# Patient Record
Sex: MALE | Race: BLACK OR AFRICAN AMERICAN
[De-identification: names, ages, dates, MRNs, and addresses within clinical notes are randomized per-mention and may not be internally consistent; named-entity substitution may affect disease eponyms.]

---

## 2017-05-03 ENCOUNTER — HOSPITAL ENCOUNTER (EMERGENCY)
Dept: HOSPITAL 61 - ER | Age: 67
Discharge: HOME | End: 2017-05-03
Payer: OTHER GOVERNMENT

## 2017-05-03 VITALS — HEIGHT: 73 IN | BODY MASS INDEX: 23.86 KG/M2 | WEIGHT: 180 LBS

## 2017-05-03 VITALS — DIASTOLIC BLOOD PRESSURE: 79 MMHG | SYSTOLIC BLOOD PRESSURE: 153 MMHG

## 2017-05-03 DIAGNOSIS — M25.552: ICD-10-CM

## 2017-05-03 DIAGNOSIS — Z88.8: ICD-10-CM

## 2017-05-03 DIAGNOSIS — F39: ICD-10-CM

## 2017-05-03 DIAGNOSIS — I10: ICD-10-CM

## 2017-05-03 DIAGNOSIS — F43.10: ICD-10-CM

## 2017-05-03 DIAGNOSIS — F10.10: ICD-10-CM

## 2017-05-03 DIAGNOSIS — F41.9: ICD-10-CM
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
## 2017-05-03 DIAGNOSIS — E78.00: ICD-10-CM

## 2017-05-03 DIAGNOSIS — G89.29: Primary | ICD-10-CM

## 2017-05-03 PROCEDURE — 99283 EMERGENCY DEPT VISIT LOW MDM: CPT

## 2017-05-03 NOTE — PHYS DOC
Past Medical History


Past Medical History:  Anxiety, High Cholesterol, Hypertension, Other


Additional Past Medical Histor:  PTSD, MOOD DISORDER, CHRONIC PAIN


Past Surgical History:  Other


Additional Past Surgical Histo:  cardiac cath with no stent placement


Alcohol Use:  Heavy


Drug Use:  None





Adult General


Chief Complaint


Chief Complaint:  HIP PAIN





HPI


HPI


Patient is a 66  year old male with a history of hypertension and PTSD, as well 

as chronic left hip pain presents the emergency room today with a complaint of 

ongoing left hip pain. Patient states that he was seen by his primary care 

provider at the [facility] 2 days ago received an injection in his hip. 

He states that the pain has been persistent and somewhat worse since that 

period of time. He denies any redness or swelling to his left hip. He denies 

any fevers, chills, myalgias.


Patient was here approximately one year ago with the same complaint. He states 

that he has not seen an orthopedic specialist as of yet for his ongoing left 

hip pain. Patient denies any additional concerns at this time.





Review of Systems


Review of Systems





Constitutional: Denies fever or chills []


Eyes: Denies change in visual acuity, redness, or eye pain []


HENT: Denies nasal congestion or sore throat []


Respiratory: Denies cough or shortness of breath []


Cardiovascular: No additional information not addressed in HPI []


GI: Denies abdominal pain, nausea, vomiting, bloody stools or diarrhea []


: Denies dysuria or hematuria []


Musculoskeletal: Denies back pain or joint pain []


Integument: Denies rash or skin lesions []


Neurologic: Denies headache, focal weakness or sensory changes []


Endocrine: Denies polyuria or polydipsia []





Allergies


Allergies





 Allergies








Coded Allergies Type Severity Reaction Last Updated Verified


 


  trazodone Allergy Intermediate  6/4/16 Yes











Physical Exam


Physical Exam





Constitutional: Well developed, well nourished, no acute distress, non-toxic 

appearance. Patient's physiologic vital signs are normal.


HENT: Normocephalic, atraumatic, bilateral external ears normal, oropharynx 

moist, no oral exudates, nose normal. []


Eyes: PERRLA, EOMI, conjunctiva normal, no discharge. [] 


Neck: Normal range of motion, no tenderness, supple, no stridor. [] 


Cardiovascular:Heart rate regular rhythm, no murmur []


Lungs & Thorax:  Bilateral breath sounds clear to auscultation []


Abdomen: Bowel sounds normal, soft, no tenderness, no masses, no pulsatile 

masses. [] 


Skin: Warm, dry, no erythema, no rash. [] 


Back: No tenderness, no CVA tenderness. [] 


Extremities: Left hip is normal in appearance. The overlying skin is without 

erythema or swelling. Patient's tenderness to palpation to the anterior lateral 

aspect of his left hip. There is no palpable defect or deformity. Patient's hip 

is stable. Patient complains of increased pain with circumduction. There is 

mild crepitation in the left hip upon circumduction. Flexion and extension is 

intact. Patient's left lower extremity is warm and dry. Neurovascularly intact 

with capillary refill less than 2 seconds in status.


Neurologic: Alert and oriented X 3, normal motor function, normal sensory 

function, no focal deficits noted. []


Psychologic: Affect normal, judgement normal, mood normal. []





Current Patient Data


Vital Signs





 Vital Signs








  Date Time  Temp Pulse Resp B/P Pulse Ox O2 Delivery O2 Flow Rate FiO2


 


5/3/17 07:10 98.1 80 18  98 Room Air  





 98.1       











EKG


EKG


[]





Radiology/Procedures


Radiology/Procedures


[]





Course & Med Decision Making


Course & Med Decision Making


Pertinent Labs and Imaging studies reviewed. (See chart for details)





[]





Dragon Disclaimer


Dragon Disclaimer


This electronic medical record was generated, in whole or in part, using a 

voice recognition dictation system.





Departure


Departure


Impression:  


 Primary Impression:  


 Hip pain, chronic


Disposition:  01 HOME, SELF-CARE


Condition:  GOOD


Referrals:  


NO PCP (PCP)


Patient Instructions:  Chronic Pain, Osteoarthritis





Additional Instructions:


1. Take the medication as prescribed. Do not take any additional acetaminophen 

while taking the pain medication. Take the pain medication only for moderate to 

severe pain.


2. Review the discharge instructions provided for self-care and reasons to 

return to the emergency department.


3. Follow-up with your primary care provider at the [facility] and 

discuss referral to orthopedic service for further evaluation of your hip pain.


Scripts


Hydrocodone/Apap 5-325 (Norco 5-325 Tablet)1 Each Tablet1 Tab PO PRN Q6HRS PRN 

PAIN #15 TAB


   Prov:DANIEL VALENTE         5/3/17








DANIEL VALENTE May 3, 2017 07:32

## 2017-05-06 ENCOUNTER — HOSPITAL ENCOUNTER (EMERGENCY)
Dept: HOSPITAL 61 - ER | Age: 67
Discharge: HOME | End: 2017-05-06
Payer: MEDICARE

## 2017-05-06 VITALS — WEIGHT: 180 LBS | BODY MASS INDEX: 23.86 KG/M2 | HEIGHT: 73 IN

## 2017-05-06 VITALS — SYSTOLIC BLOOD PRESSURE: 158 MMHG | DIASTOLIC BLOOD PRESSURE: 80 MMHG

## 2017-05-06 DIAGNOSIS — E78.00: ICD-10-CM

## 2017-05-06 DIAGNOSIS — G89.29: Primary | ICD-10-CM

## 2017-05-06 DIAGNOSIS — F41.9: ICD-10-CM

## 2017-05-06 DIAGNOSIS — Z88.5: ICD-10-CM

## 2017-05-06 DIAGNOSIS — F10.10: ICD-10-CM

## 2017-05-06 DIAGNOSIS — F43.10: ICD-10-CM

## 2017-05-06 DIAGNOSIS — I10: ICD-10-CM

## 2017-05-06 DIAGNOSIS — M25.552: ICD-10-CM

## 2017-05-06 PROCEDURE — 96372 THER/PROPH/DIAG INJ SC/IM: CPT

## 2017-05-06 PROCEDURE — 99284 EMERGENCY DEPT VISIT MOD MDM: CPT

## 2017-05-06 NOTE — PHYS DOC
Past Medical History


Past Medical History:  Anxiety, High Cholesterol, Hypertension, Other


Additional Past Medical Histor:  PTSD, MOOD DISORDER, CHRONIC PAIN


Past Surgical History:  Other


Additional Past Surgical Histo:  cardiac cath with no stent placement


Alcohol Use:  Heavy


Drug Use:  None





Adult General


Chief Complaint


Chief Complaint:  HIP PAIN





HPI


HPI





Patient is a 66  year old male with history of hypertension, high cholesterol, 

anxiety, chronic left hip pain, who presents today with exacerbation of chronic 

left hip pain since yesterday. Patient states yesterday he went to a baseball 

game. He states he walked for quite some time. Patient states when he got home 

he had exacerbation of his chronic left hip pain. [facility] and [facility] in his hip. He states he 

was in the ED 3 days ago for hip pain and was given hydrocodone for pain. He 

states the hydrocodone is not helping. Patient states he [facility] 

[facility] 2017. Patient denies any injury.





Review of Systems


Review of Systems





Constitutional: Denies fever or chills []


Eyes: Denies change in visual acuity, redness, or eye pain []


: Denies dysuria or hematuria []


Musculoskeletal: Left hip pain


Integument: Denies rash or skin lesions []


Neurologic: Denies headache, focal weakness or sensory changes []


Endocrine: Denies polyuria or polydipsia []





Current Medications


Current Medications





Current Medications








 Medications


  (Trade)  Dose


 Ordered  Sig/Isaiah  Start Time


 Stop Time Status Last Admin


Dose Admin


 


 Dexamethasone


 Sodium Phosphate


  (Decadron)  10 mg  1X  ONCE  5/6/17 07:15


 5/6/17 07:16 UNV  


 


 


 Ketorolac


 Tromethamine


  (Toradol Im)  60 mg  1X  ONCE  5/6/17 07:15


 5/6/17 07:16 UNV  


 


 


 Morphine Sulfate  5 mg  1X  ONCE  5/6/17 07:15


 5/6/17 07:16 UNV  


 











Allergies


Allergies





Allergies








Coded Allergies Type Severity Reaction Last Updated Verified


 


  trazodone Allergy Intermediate  6/4/16 Yes











Physical Exam


Physical Exam





Constitutional: Well developed, well nourished, no acute distress, non-toxic 

appearance. []


HENT: Normocephalic, atraumatic, bilateral external ears normal, oropharynx 

moist, no oral exudates, nose normal. []


Eyes: PERRLA, EOMI, conjunctiva normal, no discharge. [] 





Abdomen: Bowel sounds normal, soft, no tenderness, no masses, no pulsatile 

masses. [] 


Skin: Warm, dry, no erythema, no rash. [] 


Back: No tenderness, no CVA tenderness. [] 


Extremities: Left hip with no obvious deformity. Tenderness diffusely on 

palpation of the left posterior hip. Full range of motion to the left hip. 

Adequate internal rotation and external rotation of the left hip. Adequate 

flexion and extension of the left lower extremity. +2 left pedal pulse. Cap 

refill less than 2 seconds the left lower extremity. Sensation intact to the 

left lower extremity.


Neurologic: Alert and oriented X 3, normal motor function, normal sensory 

function, no focal deficits noted. []


Psychologic: Affect normal, judgement normal, mood normal. []





Current Patient Data


Vital Signs





 Vital Signs








  Date Time  Temp Pulse Resp B/P (MAP) Pulse Ox O2 Delivery O2 Flow Rate FiO2


 


5/6/17 07:05 97.2 80 18 181/87 (118) 100 Room Air  





 97.2       











EKG


EKG


[]





Radiology/Procedures


Radiology/Procedures


[]





Course & Med Decision Making


Course & Med Decision Making


Pertinent Labs and Imaging studies reviewed. (See chart for details)





Patient is in the ED with exacerbation of chronic left hip pain. He'll be given 

Toradol, morphine, and Decadron IM in the ED. He has pain medicine at home. 

Gave him a prescription of Medrol Dosepak. Follow-up with his own doctor as 

soon as he can.





Dragon Disclaimer


Dragon Disclaimer


This electronic medical record was generated, in whole or in part, using a 

voice recognition dictation system.





Departure


Departure


Impression:  


 Primary Impression:  


 Hip pain, chronic


Disposition:  01 HOME, SELF-CARE


Condition:  STABLE


Referrals:  


NO PCP (PCP)








MARISSA DOYLE MD


Follow-up in one week


Patient Instructions:  Hip Pain





Additional Instructions:  


You were seen for exacerbation of chronic hip pain. Please take the prescribed 

medicines as ordered. Continue taking your hydrocodone at home as needed as 

well as an [facility]. [facility] with [facility] 

[facility] as soon as possible or the doctor we provide. He is an orthopedic doctor.


Scripts


Methylprednisolone (MEDROL) 4 Mg Tab.ds.pk


1 PKG PO UD, #1 PKG


   Prov: PAULINE ACE APRN         5/6/17





Problem Qualifiers








 Primary Impression:  


 Hip pain, chronic


 Laterality:  left  Qualified Codes:  M25.552 - Pain in left hip; G89.29 - 

Other chronic pain








PAULINE ACE May 6, 2017 07:25

## 2017-05-07 ENCOUNTER — HOSPITAL ENCOUNTER (INPATIENT)
Dept: HOSPITAL 61 - ER | Age: 67
LOS: 2 days | Discharge: HOME | DRG: 314 | End: 2017-05-09
Attending: INTERNAL MEDICINE | Admitting: INTERNAL MEDICINE
Payer: MEDICARE

## 2017-05-07 VITALS — SYSTOLIC BLOOD PRESSURE: 141 MMHG | DIASTOLIC BLOOD PRESSURE: 67 MMHG

## 2017-05-07 VITALS — SYSTOLIC BLOOD PRESSURE: 128 MMHG | DIASTOLIC BLOOD PRESSURE: 71 MMHG

## 2017-05-07 VITALS — DIASTOLIC BLOOD PRESSURE: 62 MMHG | SYSTOLIC BLOOD PRESSURE: 130 MMHG

## 2017-05-07 VITALS — DIASTOLIC BLOOD PRESSURE: 75 MMHG | SYSTOLIC BLOOD PRESSURE: 120 MMHG

## 2017-05-07 VITALS — DIASTOLIC BLOOD PRESSURE: 75 MMHG | SYSTOLIC BLOOD PRESSURE: 156 MMHG

## 2017-05-07 VITALS — DIASTOLIC BLOOD PRESSURE: 77 MMHG | SYSTOLIC BLOOD PRESSURE: 135 MMHG

## 2017-05-07 VITALS — HEIGHT: 73 IN | BODY MASS INDEX: 24.04 KG/M2 | WEIGHT: 181.38 LBS

## 2017-05-07 DIAGNOSIS — Z82.49: ICD-10-CM

## 2017-05-07 DIAGNOSIS — E87.1: ICD-10-CM

## 2017-05-07 DIAGNOSIS — I10: ICD-10-CM

## 2017-05-07 DIAGNOSIS — G89.29: ICD-10-CM

## 2017-05-07 DIAGNOSIS — I95.9: Primary | ICD-10-CM

## 2017-05-07 DIAGNOSIS — N17.0: ICD-10-CM

## 2017-05-07 DIAGNOSIS — F41.9: ICD-10-CM

## 2017-05-07 DIAGNOSIS — M19.90: ICD-10-CM

## 2017-05-07 DIAGNOSIS — F10.20: ICD-10-CM

## 2017-05-07 DIAGNOSIS — F39: ICD-10-CM

## 2017-05-07 DIAGNOSIS — E87.6: ICD-10-CM

## 2017-05-07 DIAGNOSIS — Z88.8: ICD-10-CM

## 2017-05-07 DIAGNOSIS — E87.5: ICD-10-CM

## 2017-05-07 DIAGNOSIS — E78.00: ICD-10-CM

## 2017-05-07 DIAGNOSIS — R33.9: ICD-10-CM

## 2017-05-07 DIAGNOSIS — E22.2: ICD-10-CM

## 2017-05-07 DIAGNOSIS — F17.200: ICD-10-CM

## 2017-05-07 DIAGNOSIS — R00.1: ICD-10-CM

## 2017-05-07 DIAGNOSIS — F43.10: ICD-10-CM

## 2017-05-07 LAB
ALBUMIN SERPL-MCNC: 3.5 G/DL (ref 3.4–5)
ALP SERPL-CCNC: 59 U/L (ref 46–116)
ALT SERPL-CCNC: 29 U/L (ref 16–63)
ANION GAP SERPL CALC-SCNC: 8 MMOL/L (ref 6–14)
ANION GAP SERPL CALC-SCNC: 9 MMOL/L (ref 6–14)
AST SERPL-CCNC: 26 U/L (ref 15–37)
BASOPHILS # BLD AUTO: 0 X10^3/UL (ref 0–0.2)
BASOPHILS NFR BLD: 0 % (ref 0–3)
BILIRUB DIRECT SERPL-MCNC: 0.2 MG/DL (ref 0–0.2)
BILIRUB SERPL-MCNC: 0.6 MG/DL (ref 0.2–1)
BUN SERPL-MCNC: 29 MG/DL (ref 8–26)
BUN SERPL-MCNC: 31 MG/DL (ref 8–26)
CALCIUM SERPL-MCNC: 7.7 MG/DL (ref 8.5–10.1)
CALCIUM SERPL-MCNC: 8.3 MG/DL (ref 8.5–10.1)
CHLORIDE SERPL-SCNC: 86 MMOL/L (ref 98–107)
CHLORIDE SERPL-SCNC: 89 MMOL/L (ref 98–107)
CO2 SERPL-SCNC: 22 MMOL/L (ref 21–32)
CO2 SERPL-SCNC: 23 MMOL/L (ref 21–32)
CREAT SERPL-MCNC: 2.3 MG/DL (ref 0.7–1.3)
CREAT SERPL-MCNC: 2.4 MG/DL (ref 0.7–1.3)
EOSINOPHIL NFR BLD: 0 % (ref 0–3)
ERYTHROCYTE [DISTWIDTH] IN BLOOD BY AUTOMATED COUNT: 16.3 % (ref 11.5–14.5)
GFR SERPLBLD BASED ON 1.73 SQ M-ARVRAT: 33 ML/MIN
GFR SERPLBLD BASED ON 1.73 SQ M-ARVRAT: 34.7 ML/MIN
GLUCOSE SERPL-MCNC: 119 MG/DL (ref 70–99)
GLUCOSE SERPL-MCNC: 126 MG/DL (ref 70–99)
HCT VFR BLD CALC: 30.9 % (ref 39–53)
HGB BLD-MCNC: 10.3 G/DL (ref 13–17.5)
LYMPHOCYTES # BLD: 1.4 X10^3/UL (ref 1–4.8)
LYMPHOCYTES NFR BLD AUTO: 9 % (ref 24–48)
MAGNESIUM SERPL-MCNC: 2.1 MG/DL (ref 1.8–2.4)
MCH RBC QN AUTO: 27 PG (ref 25–35)
MCHC RBC AUTO-ENTMCNC: 33 G/DL (ref 31–37)
MCV RBC AUTO: 82 FL (ref 79–100)
MONOCYTES NFR BLD: 7 % (ref 0–9)
NEUTROPHILS NFR BLD AUTO: 84 % (ref 31–73)
PLATELET # BLD AUTO: 282 X10^3/UL (ref 140–400)
PLATELET # BLD EST: ADEQUATE 10*3/UL
POLYCHROMASIA BLD QL SMEAR: SLIGHT
POTASSIUM SERPL-SCNC: 6.1 MMOL/L (ref 3.5–5.1)
POTASSIUM SERPL-SCNC: 6.5 MMOL/L (ref 3.5–5.1)
PROT SERPL-MCNC: 7.1 G/DL (ref 6.4–8.2)
RBC # BLD AUTO: 3.78 X10^6/UL (ref 4.3–5.7)
SODIUM SERPL-SCNC: 117 MMOL/L (ref 136–145)
SODIUM SERPL-SCNC: 120 MMOL/L (ref 136–145)
WBC # BLD AUTO: 15.1 X10^3/UL (ref 4–11)

## 2017-05-07 PROCEDURE — 80048 BASIC METABOLIC PNL TOTAL CA: CPT

## 2017-05-07 PROCEDURE — 80076 HEPATIC FUNCTION PANEL: CPT

## 2017-05-07 PROCEDURE — 96361 HYDRATE IV INFUSION ADD-ON: CPT

## 2017-05-07 PROCEDURE — 87641 MR-STAPH DNA AMP PROBE: CPT

## 2017-05-07 PROCEDURE — 93005 ELECTROCARDIOGRAM TRACING: CPT

## 2017-05-07 PROCEDURE — 83880 ASSAY OF NATRIURETIC PEPTIDE: CPT

## 2017-05-07 PROCEDURE — 71250 CT THORAX DX C-: CPT

## 2017-05-07 PROCEDURE — 84443 ASSAY THYROID STIM HORMONE: CPT

## 2017-05-07 PROCEDURE — 82728 ASSAY OF FERRITIN: CPT

## 2017-05-07 PROCEDURE — 82746 ASSAY OF FOLIC ACID SERUM: CPT

## 2017-05-07 PROCEDURE — 85045 AUTOMATED RETICULOCYTE COUNT: CPT

## 2017-05-07 PROCEDURE — 83735 ASSAY OF MAGNESIUM: CPT

## 2017-05-07 PROCEDURE — 83550 IRON BINDING TEST: CPT

## 2017-05-07 PROCEDURE — 99406 BEHAV CHNG SMOKING 3-10 MIN: CPT

## 2017-05-07 PROCEDURE — 96374 THER/PROPH/DIAG INJ IV PUSH: CPT

## 2017-05-07 PROCEDURE — 83540 ASSAY OF IRON: CPT

## 2017-05-07 PROCEDURE — 76770 US EXAM ABDO BACK WALL COMP: CPT

## 2017-05-07 PROCEDURE — 36415 COLL VENOUS BLD VENIPUNCTURE: CPT

## 2017-05-07 PROCEDURE — 85027 COMPLETE CBC AUTOMATED: CPT

## 2017-05-07 PROCEDURE — 81001 URINALYSIS AUTO W/SCOPE: CPT

## 2017-05-07 PROCEDURE — 82607 VITAMIN B-12: CPT

## 2017-05-07 PROCEDURE — 80061 LIPID PANEL: CPT

## 2017-05-07 PROCEDURE — 71010: CPT

## 2017-05-07 PROCEDURE — 85007 BL SMEAR W/DIFF WBC COUNT: CPT

## 2017-05-07 PROCEDURE — 93306 TTE W/DOPPLER COMPLETE: CPT

## 2017-05-07 PROCEDURE — 84484 ASSAY OF TROPONIN QUANT: CPT

## 2017-05-07 NOTE — ACF
Admission Forms Criteria


 HYPONATREMIA; HYPERNATREMIA; HYPOKALEMIA; 


 HYPERKALEMIA; HYPOCALCEMIA; HYPERCALCEMIA





Clinical Indications for Inpatient Care





                                                            (Place 'X' for any 

and all applicable criteria):





Ongoing inpatient care may be indicated for ANY ONE of the following [G](1)(2)(3

)(5):





[X ]I.    Hyponatremia with ANY ONE of the following:


         [X ]a)  Sodium less than 130 mEq/L (mmol/L) (new) (6)(22)


         [ ]b)  Sodium less than 135 mEq/L (mmol/L) with ANY ONE of the 

following:


                [ ]i)    Severe medical etiology requiring inpatient management 

(eg, heart failure, hypovolemia)


                [ ]ii)   Altered mental status     


                [ ]iii)  Seizures


[ ]II.    Hypernatremia with ANY ONE of the following:


          [ ]a) Sodium greater than 155 mEq/L (mmol/L)


          [ ]b) Sodium greater than 150 mEq/L (mmol/L) with ANY ONE of the 

following:


                [ ] i)   Altered mental status 


                [ ]ii)   Seizures


                [ ]iii)  Severe medical etiology (eg, hypovolemia, diabetes 

insipidus)


                [ ]iv)  Severe weakness


                [ ]v)   Severe medical etiology (eg, hemolysis, infection, drug 

overdose) 


[ ]III.  Hypokalemia with ANY ONE of the following:


        [ ]a)  Potassium less than 2.5 mEq/L (mmol/L) despite outpatient and 

emergency treatment 


        [ ]b)  Potassium less than 3.0 mEq/L (mmol/L) with ANY ONE of the 

following:


                [ ]i)    Weakness


                [ ]ii)   Cardiac abnormality (eg, arrhythmia, conduction 

disturbance)


                [ ]iii)  Cardiac ischemia 


                [ ]iv)   Ileus


                [ ]v)   Ongoing medical cause requiring inpatient management. (

e.g., acute renal wasting, SIADH)


                [ ]vi)  Other severe symptoms      


[ ] IV. Hyperkalemia with ANY ONE of the following:


         [ ]a)  Potassium greater than 6.5 mEq/L (mmol/L)


         [ ]b)  Potassium greater than 5 mEq/L (mmol/L) with  ANY ONE of the 

following:


                [ ]i)    Severe ECG findings [H]


                [ ]ii)   Acute worsening of renal failure (creatinine greater 

than 2.5 mg/dL (221 micromoles/L) 


                        or significant elevation for age and size)


[ ] V.  Hypocalcemia with ANY ONE of the following:


         [ ]a)  Calcium less than 7 mg/dL (1.75 mmol/L) despite outpatient and 

emergency treatment(19)


         [ ]b)  Calcium less than 8 mg/dL (2 mmol/L) with significant symptoms 

or findings; examples include:


                [ ]i)     Cardiac abnormality (eg, arrhythmia or conduction 

disturbance)


                [ ]ii)    Altered mental status 


                [ ]iii)   Seizures


                [ ]iv)   Breathing difficulty 


                [ ]v)    Muscle spasms


[ ]VI. Hypercalcemia with ANY ONE of the following:      


        [ ]a)  Calcium greater than 14 mg/dL (3.5 mmol/L)


        [ ]b)  Calcium greater than 12 mg/dL (3 mmol/L) with ANY ONE of the 

following:


                [ ]i) Significant dehydration or hypovolemia as indicated by 

ANY ONE of the following(2):


                      [ ]1. Clinically significant dehydration as indicated by 

ANY ONE of the following:   


                             [ ]A. Acute loss of weight from baseline (5% of 

body weight in adults, 9% in pediatric patients)


                             [ ]B. Hemodynamic instability


                             [ ]C. Acute renal failure


                             [ ]D. Serum sodium greater than 150 mEq/L (mmol/L)

               


                      [ ]2) Dehydration that is persistent indicated by ALL of 

the following:


                             [ ]A. Oral rehydration therapy not tolerated or 

insufficient to adequately correct dehydration


                             [ ]B. Appropriate intravenous treatment (eg, fluids

) does not readily correct dehydration ie, after 12 to 24 hours of treatment)


                [ ]ii) Significant symptoms or findings; examples include: 


                       [ ]1) Altered mental status


                       [ ]2) Cardiac abnormality (eg, arrhythmia, conduction 

disturbance)     


                       [ ]3) Cardiac abnormality (eg, arrhythmia, conduction 

disturbance)








The original [facility] content created by [facility] has been revised. 


The portions of  the content which have been revised are identified through the 

use of italic text or in bold, and [facility] [employer] 


has neither reviewed nor approved the modified material. All other unmodified 

content is copyright  [facility] [employer]    





Please see references footnoted in the original [facility] [employer] edition 

2016


Admission Criteria Met?:  Yes











RAMSEY JACOME May 7, 2017 21:32

## 2017-05-07 NOTE — PHYS DOC
Past Medical History


Past Medical History:  Anxiety, High Cholesterol, Hypertension, Other


Additional Past Medical Histor:  PTSD, MOOD DISORDER, CHRONIC PAIN


Past Surgical History:  Other


Additional Past Surgical Histo:  cardiac cath with no stent placement


Alcohol Use:  Heavy


Drug Use:  None





Adult General


Chief Complaint


Chief Complaint:  DIZZY/LIGHT HEADED





HPI


HPI





Patient is a 66  year old male who presents to the ED with the complaint of 

dizzy, weak, and low blood pressure. Patient states that about 1 or 1-1/2 weeks 

ago, his doctor put him on amlodipine for blood pressure. He has been taking 

lisinopril for hypertension for a long time and has not had any problems with 

it. Over the last few weeks, he is been suffering from hip pain, it's unclear 

to me whether he has sciatica or some type of DJD problem in his hip. When he 

saw his doctor about 1-1/2 weeks ago for a visit, he is his hip was bothering 

him a lot. His blood pressure was elevated and his doctor added amlodipine to 

his regimen. He began taking this as prescribed and took a dose of his 

antihypertensives this morning sometime before noon. He was seen here yesterday 

with hip pain and was given a prescription for Medrol Dosepak and Norco which 

he has been taking, states that his hip pain is much better today. Ever since 

he got up this morning, he is been dizzy. His wife checked his blood pressure a 

few times and he brings in a record of several blood pressures with systolics 

in the  range. He said that's low for him.





He denies chest pain, shortness of air, denies vomiting or diarrhea, denies any 

blood in his stool or black stools. He has not had any other new medications 

other than those mentioned above. He's never had an episode like this before. 

He denies history of slow heart rate or atrial fibrillation.





Review of Systems


Review of Systems





Constitutional: Denies fever or chills []


Eyes: Denies change in visual acuity, redness, or eye pain []


HENT: Denies nasal congestion or sore throat []


Respiratory: Denies cough or shortness of breath []


Cardiovascular: Denies chest pain


GI: Denies abdominal pain, nausea, vomiting, bloody stools or diarrhea []


: Denies dysuria or hematuria []


Musculoskeletal: Denies back pain or joint pain []


Integument: Denies rash or skin lesions []


Neurologic: Denies headache, focal weakness or sensory changes []





Current Medications


Current Medications





Current Medications








 Medications


  (Trade)  Dose


 Ordered  Sig/Isaiah  Start Time


 Stop Time Status Last Admin


Dose Admin


 


 Sodium


 Polystyrene


 Sulfonate


  (Kayexalate)  15 gm  1X  ONCE  5/7/17 18:15


 5/7/17 18:16 DC 5/7/17 18:27


15 GM


 


 Sodium Chloride  1,000 ml @ 


 1,000 mls/hr  Q1H  5/7/17 17:15


 5/7/17 18:14 DC 5/7/17 17:14


1,000 MLS/HR











Allergies


Allergies





Allergies








Coded Allergies Type Severity Reaction Last Updated Verified


 


  trazodone Allergy Intermediate  6/4/16 Yes











Physical Exam


Physical Exam





Constitutional: Well developed, well nourished, no acute distress, non-toxic 

appearance. Alert, mentating normally, does not appear clinically hypotensive.


HENT: Normocephalic, atraumatic, bilateral external ears normal,  nose normal. [

]


Eyes:  conjunctiva normal, no discharge. [] 


Neck: Normal range of motion,  no stridor. [] 


Cardiovascular:Heart rate regular bradycardia approximately 40, no murmur


Lungs & Thorax:  Bilateral breath sounds clear to auscultation []


Abdomen: Bowel sounds normal, soft, no tenderness, no masses, no pulsatile 

masses. [] 


Skin: Warm, dry, no erythema, no rash. [] 


Extremities: No tenderness, no cyanosis, no clubbing, ROM intact, no edema. [] 


Neurologic: Alert and oriented X 3, normal motor function, normal sensory 

function, no focal deficits noted. []





Current Patient Data


Vital Signs





 Vital Signs








  Date Time  Temp Pulse Resp B/P (MAP) Pulse Ox O2 Delivery O2 Flow Rate FiO2


 


5/7/17 20:00  42  108/62 (77) 97 Room Air  


 


5/7/17 16:17 98.0  18     





 98.0       








Lab Values





 Laboratory Tests








Test


  5/7/17


16:35


 


White Blood Count


  15.1 x10^3/uL


(4.0-11.0)  H


 


Red Blood Count


  3.78 x10^6/uL


(4.30-5.70)  L


 


Hemoglobin


  10.3 g/dL


(13.0-17.5)  L


 


Hematocrit


  30.9 %


(39.0-53.0)  L


 


Mean Corpuscular Volume


  82 fL ()


 


 


Mean Corpuscular Hemoglobin 27 pg (25-35)  


 


Mean Corpuscular Hemoglobin


Concent 33 g/dL


(31-37)


 


Red Cell Distribution Width


  16.3 %


(11.5-14.5)  H


 


Platelet Count


  282 x10^3/uL


(140-400)


 


Neutrophils (%) (Auto) 84 % (31-73)  H


 


Lymphocytes (%) (Auto) 9 % (24-48)  L


 


Monocytes (%) (Auto) 7 % (0-9)  


 


Eosinophils (%) (Auto) 0 % (0-3)  


 


Basophils (%) (Auto) 0 % (0-3)  


 


Neutrophils # (Auto)


  12.7 x10^3uL


(1.8-7.7)  H


 


Lymphocytes # (Auto)


  1.4 x10^3/uL


(1.0-4.8)


 


Monocytes # (Auto)


  1.0 x10^3/uL


(0.0-1.1)


 


Eosinophils # (Auto)


  0.0 x10^3/uL


(0.0-0.7)


 


Basophils # (Auto)


  0.0 x10^3/uL


(0.0-0.2)


 


Segmented Neutrophils % 73 % (35-66)  H


 


Band Neutrophils % 3 % (0-9)  


 


Lymphocytes % 17 % (24-48)  L


 


Monocytes % 7 % (0-10)  


 


Platelet Estimate


  Adequate


(ADEQUATE)


 


Polychromasia Slight  


 


Sodium Level


  117 mmol/L


(136-145)  *L


 


Potassium Level


  6.5 mmol/L


(3.5-5.1)  *H


 


Chloride Level


  86 mmol/L


()  L


 


Carbon Dioxide Level


  23 mmol/L


(21-32)


 


Anion Gap 8 (6-14)  


 


Blood Urea Nitrogen


  29 mg/dL


(8-26)  H


 


Creatinine


  2.4 mg/dL


(0.7-1.3)  H


 


Estimated GFR


(Cockcroft-Gault) 33.0  


 


 


Glucose Level


  126 mg/dL


(70-99)  H


 


Calcium Level


  8.3 mg/dL


(8.5-10.1)  L


 


Magnesium Level


  2.1 mg/dL


(1.8-2.4)


 


Total Bilirubin


  0.6 mg/dL


(0.2-1.0)


 


Direct Bilirubin


  0.2 mg/dL


(0.0-0.2)


 


Aspartate Amino Transferase


(AST) 26 U/L (15-37)


 


 


Alanine Aminotransferase (ALT)


  29 U/L (16-63)


 


 


Alkaline Phosphatase


  59 U/L


()


 


Troponin I Quantitative


  < 0.017 ng/mL


(0.000-0.055)


 


NT-Pro-B-Type Natriuretic


Peptide 2216 pg/mL


(0-124)  H


 


Total Protein


  7.1 g/dL


(6.4-8.2)


 


Albumin


  3.5 g/dL


(3.4-5.0)





 Laboratory Tests


5/7/17 16:35








 Laboratory Tests


5/7/17 16:35














EKG


EKG


Twelve-lead EKG read by me. There are no P waves. The rhythm is regular. It is 

likely junctional. It is narrow complex. There are no acute ST or T wave 

changes indicative of ischemia or infarction. No STEMI. []





Radiology/Procedures


Radiology/Procedures


[]





Course & Med Decision Making


Course & Med Decision Making


Pertinent Labs and Imaging studies reviewed. (See chart for details)





66-year-old male presents with weakness, hypotension, bradycardia. He did 

recently start amlodipine about 1 or 1-1/2 weeks ago.





Labs remarkable for significant hyponatremia, hyperkalemia, elevated 

creatinine. I do not know his baseline, [facility].





His potassium is 6.5 which I'm not sure would cause a junctional rhythm but I 

did treat him with IV fluids, normal saline, and oral Kayexalate. Patient 

rested comfortably in the emergency department. He remained bradycardic 

approximately 38 but he was alert, mentating normally, without complaints while 

laying in the ED on the cart. I discussed admission to the hospital with him 

and he is agreeable to that.





I discussed the case with Dr. Paez, hospitals medicine, who will admit the 

patient. I wrote bridge orders. She requested a consult with Dr. Cerda, 

cardiology. Also a consult with nephrology.





I discussed the case with Dr. Cerda, who suggested that we give him some 

more fluids and also IV Lasix for the hyperkalemia, which was done. The patient 

remained stable and went to the ICU in stable and unchanged condition.





[]





Dragon Disclaimer


Dragon Disclaimer


This electronic medical record was generated, in whole or in part, using a 

voice recognition dictation system.





Departure


Departure


Impression:  


 Primary Impression:  


 Bradycardia


 Additional Impression:  


 Hyperkalemia


Disposition:  09 ADMITTED AS INPATIENT


Admitting Physician:  Tami Paez


Condition:  STABLE


Referrals:  


NO PCP (PCP)





Problem Qualifiers











FIOR CADENA MD May 7, 2017 20:23

## 2017-05-08 VITALS — DIASTOLIC BLOOD PRESSURE: 58 MMHG | SYSTOLIC BLOOD PRESSURE: 117 MMHG

## 2017-05-08 VITALS — SYSTOLIC BLOOD PRESSURE: 135 MMHG | DIASTOLIC BLOOD PRESSURE: 67 MMHG

## 2017-05-08 VITALS — DIASTOLIC BLOOD PRESSURE: 78 MMHG | SYSTOLIC BLOOD PRESSURE: 127 MMHG

## 2017-05-08 VITALS — DIASTOLIC BLOOD PRESSURE: 69 MMHG | SYSTOLIC BLOOD PRESSURE: 147 MMHG

## 2017-05-08 VITALS — DIASTOLIC BLOOD PRESSURE: 66 MMHG | SYSTOLIC BLOOD PRESSURE: 131 MMHG

## 2017-05-08 VITALS — SYSTOLIC BLOOD PRESSURE: 110 MMHG | DIASTOLIC BLOOD PRESSURE: 58 MMHG

## 2017-05-08 VITALS — SYSTOLIC BLOOD PRESSURE: 109 MMHG | DIASTOLIC BLOOD PRESSURE: 61 MMHG

## 2017-05-08 VITALS — DIASTOLIC BLOOD PRESSURE: 57 MMHG | SYSTOLIC BLOOD PRESSURE: 110 MMHG

## 2017-05-08 VITALS — SYSTOLIC BLOOD PRESSURE: 130 MMHG | DIASTOLIC BLOOD PRESSURE: 76 MMHG

## 2017-05-08 VITALS — DIASTOLIC BLOOD PRESSURE: 75 MMHG | SYSTOLIC BLOOD PRESSURE: 113 MMHG

## 2017-05-08 VITALS — DIASTOLIC BLOOD PRESSURE: 71 MMHG | SYSTOLIC BLOOD PRESSURE: 141 MMHG

## 2017-05-08 VITALS — DIASTOLIC BLOOD PRESSURE: 71 MMHG | SYSTOLIC BLOOD PRESSURE: 134 MMHG

## 2017-05-08 VITALS — SYSTOLIC BLOOD PRESSURE: 122 MMHG | DIASTOLIC BLOOD PRESSURE: 67 MMHG

## 2017-05-08 VITALS — SYSTOLIC BLOOD PRESSURE: 150 MMHG | DIASTOLIC BLOOD PRESSURE: 75 MMHG

## 2017-05-08 LAB
ANION GAP SERPL CALC-SCNC: 10 MMOL/L (ref 6–14)
ANION GAP SERPL CALC-SCNC: 8 MMOL/L (ref 6–14)
BACTERIA #/AREA URNS HPF: 0 /HPF
BILIRUB UR QL STRIP: NEGATIVE
BUN SERPL-MCNC: 26 MG/DL (ref 8–26)
BUN SERPL-MCNC: 27 MG/DL (ref 8–26)
CALCIUM SERPL-MCNC: 8.7 MG/DL (ref 8.5–10.1)
CALCIUM SERPL-MCNC: 9.2 MG/DL (ref 8.5–10.1)
CHLORIDE SERPL-SCNC: 93 MMOL/L (ref 98–107)
CHLORIDE SERPL-SCNC: 95 MMOL/L (ref 98–107)
CO2 SERPL-SCNC: 24 MMOL/L (ref 21–32)
CO2 SERPL-SCNC: 24 MMOL/L (ref 21–32)
CREAT SERPL-MCNC: 1.6 MG/DL (ref 0.7–1.3)
CREAT SERPL-MCNC: 1.9 MG/DL (ref 0.7–1.3)
GFR SERPLBLD BASED ON 1.73 SQ M-ARVRAT: 43.3 ML/MIN
GFR SERPLBLD BASED ON 1.73 SQ M-ARVRAT: 52.8 ML/MIN
GLUCOSE SERPL-MCNC: 101 MG/DL (ref 70–99)
GLUCOSE SERPL-MCNC: 121 MG/DL (ref 70–99)
GLUCOSE UR STRIP-MCNC: NEGATIVE MG/DL
NITRITE UR QL STRIP: NEGATIVE
PH UR STRIP: 5 [PH]
POTASSIUM SERPL-SCNC: 4.8 MMOL/L (ref 3.5–5.1)
POTASSIUM SERPL-SCNC: 5.3 MMOL/L (ref 3.5–5.1)
PROT UR STRIP-MCNC: 30 MG/DL
RBC #/AREA URNS HPF: 0 /HPF (ref 0–2)
SODIUM SERPL-SCNC: 125 MMOL/L (ref 136–145)
SODIUM SERPL-SCNC: 129 MMOL/L (ref 136–145)
SP GR UR STRIP: 1.02
SQUAMOUS #/AREA URNS LPF: (no result) /LPF
UROBILINOGEN UR-MCNC: 0.2 MG/DL
WBC #/AREA URNS HPF: (no result) /HPF (ref 0–4)

## 2017-05-08 RX ADMIN — METHYLPREDNISOLONE SCH MG: 4 TABLET ORAL at 17:53

## 2017-05-08 RX ADMIN — I-VITE, TAB 1000-60-2MG (60/BT) SCH TAB: TAB at 10:49

## 2017-05-08 RX ADMIN — MELOXICAM SCH MG: 7.5 TABLET ORAL at 10:49

## 2017-05-08 RX ADMIN — BACITRACIN SCH MLS/HR: 5000 INJECTION, POWDER, FOR SOLUTION INTRAMUSCULAR at 22:22

## 2017-05-08 RX ADMIN — CITALOPRAM HYDROBROMIDE SCH MG: 20 TABLET ORAL at 10:50

## 2017-05-08 RX ADMIN — HYDROCODONE BITARTRATE AND ACETAMINOPHEN PRN TAB: 5; 325 TABLET ORAL at 17:53

## 2017-05-08 RX ADMIN — HYDROCODONE BITARTRATE AND ACETAMINOPHEN PRN TAB: 5; 325 TABLET ORAL at 10:50

## 2017-05-08 RX ADMIN — PANTOPRAZOLE SODIUM SCH MG: 40 TABLET, DELAYED RELEASE ORAL at 10:49

## 2017-05-08 RX ADMIN — METHYLPREDNISOLONE SCH MG: 4 TABLET ORAL at 10:49

## 2017-05-08 RX ADMIN — LISINOPRIL SCH MG: 20 TABLET ORAL at 10:50

## 2017-05-08 RX ADMIN — BACITRACIN SCH MLS/HR: 5000 INJECTION, POWDER, FOR SOLUTION INTRAMUSCULAR at 10:48

## 2017-05-08 RX ADMIN — METHYLPREDNISOLONE SCH MG: 4 TABLET ORAL at 13:02

## 2017-05-08 RX ADMIN — MELOXICAM SCH MG: 7.5 TABLET ORAL at 20:36

## 2017-05-08 NOTE — RAD
Exam performed: Renal sonogram. 



Indication: Acute renal insufficiency



Date of Service: 05/08/17 . Comparison: None available 



Technique: Real-time grayscale imaging of the kidneys is performed and images

are obtained. 



Findings:



Both kidneys are normal in size and echogenicity.  The right kidney measures

11.0 x 4.3 x 4.4 cm whereas the left kidney measures 11.0 x 3.8 x 6.1 cm. 

There is no hydronephrosis or perinephric fluid collection.  



The urinary bladder is well distended and appears normal. 



Impression:



    1. Essentially unremarkable renal sonogram.

## 2017-05-08 NOTE — CARD
--------------- APPROVED REPORT --------------





EXAM: Two-dimensional and M-mode echocardiogram with Doppler and color Doppler.



Other Information 

Quality : Good



INDICATION

Abnormal ECG 

Hypotension, Bradycardia



2D DIMENSIONS 

RVDd3.2 (2.9-3.5cm)Left Atrium(2D)3.7 (1.6-4.0cm)

IVSd1.1 (0.7-1.1cm)Aortic Root(2D)2.8 (2.0-3.7cm)

LVDd4.7 (3.9-5.9cm)LVOT Diameter2.2 (1.8-2.4cm)

PWd1.1 (0.7-1.1cm)LVDs2.5 (2.5-4.0cm)

FS (%) 30.0 %SV80.5 ml

LVEF(%)60.0 (>50%)



Aortic Valve

AoV Peak Bhavesh.158.8cm/sAoV VTI29.5cm

AO Peak GR.10.1mmHgLVOT  VTI 26.49cm

AO Mean GR.5mmHgAVA   (VTI)3.50cm2



Mitral Valve

MV E Xqtfufnt50.5cm/sMV DECEL [id number]

MV A Phppdilb20.8cm/sE/A  Ratio0.9



TDI

Lateral E' P. V12.55cm/sMedial E' P. V8.32cm/s

E/Lateral E'6.0E/Medial E'9.1



Tricuspid Valve

TR P. Hympnjdk080cc/sRAP BLNWCOBV2oaAd

TR Peak Gr.[id number]



Pulmonary Vein

S1 Vpudibcj81.6cm/sS2 Ygojnhoh27.50cm/s

D2 Ckmaurxg80.5cm/sPVa ydbjmkgt04egst



 LEFT VENTRICLE 

The left ventricle is normal size. There is normal left ventricular wall thickness. The left ventricu
lar systolic function is normal and the ejection fraction is within normal range. The Ejection Fracti
on is 60%. There is normal LV segmental wall motion. Transmitral Doppler flow pattern is Grade I-abno
rmal relaxation pattern.



 RIGHT VENTRICLE 

The right ventricle is normal size. The right ventricular systolic function is normal.



 ATRIA 

The left atrium size is normal. The right atrium size is normal. The interatrial septum is intact wit
h no evidence for an atrial septal defect or patent foramen ovale as noted on 2-D or Doppler imaging.




 AORTIC VALVE 

The aortic valve is mildly thickened but opens well. Doppler and Color Flow revealed no significant a
ortic regurgitation. There is no significant aortic valvular stenosis.



 MITRAL VALVE 

The mitral valve is normal in structure There is no evidence of mitral valve prolapse. There is no mi
tral valve stenosis. Doppler and Color-flow revealed trace mitral regurgitation.



 TRICUSPID VALVE 

The tricuspid valve is normal in structure Doppler and Color Flow revealed trace tricuspid regurgitat
ion. There is mild pulmonary hypertension. The PA pressure was estimated at 31 mmHg. There is no tric
uspid valve stenosis.



 PULMONIC VALVE 

The pulmonary valve is normal in structure Doppler and Color Flow revealed trace pulmonic valvular re
gurgitation. There is no pulmonic valvular stenosis.



 GREAT VESSELS 

The aortic root is normal in size. The ascending aorta is normal in size. The IVC is normal in size a
nd collapses >50% with inspiration.



 PERICARDIAL EFFUSION 

There is no evidence of significant pericardial effusion.



Critical Notification

Critical Value: No



<Conclusion>

The left ventricular systolic function is normal and the ejection fraction is within normal range.

The Ejection Fraction is 60%.

Transmitral Doppler flow pattern is Grade I-abnormal relaxation pattern.

The left atrium size is normal.

The right atrium size is normal.

The aortic valve is mildly thickened but opens well.

Doppler and Color-flow revealed trace mitral regurgitation.

Doppler and Color Flow revealed trace tricuspid regurgitation.

There is mild pulmonary hypertension.

The PA pressure was estimated at 31 mmHg.

Doppler and Color Flow revealed trace pulmonic valvular regurgitation.

There is no evidence of significant pericardial effusion.

## 2017-05-08 NOTE — RAD
Indication: Indication: Abnormal metabolic profile.



Axial imaging through the chest was performed without intravenous contrast.



No prior studies are available for comparison.



No axillary lymphadenopathy is seen. There are calcified lymph nodes in the

mediastinum and ponce likely from prior granulomatous exposure. No pericardial

fluid is identified. There is trace left and small right pleural effusion.

Innumerable calcified nodules are identified throughout both lungs consistent

with granulomas. Multiple patchy groundglass opacities are noted within

bilateral upper lobes, greatest on the left. The lower lobes are unremarkable

apart from some mild infiltrate or atelectasis posteriorly in the right lower

lobe. The upper abdomen is unremarkable.



Impression: Patchy groundglass infiltrates are identified bilateral upper

lobes. This likely is on an infectious or inflammatory basis. There are also

trace left and small right pleural effusions.





















PQRS Compliance Statement:



One or more of the following individualized dose reduction techniques were

utilized for this examination:  

1. Automated exposure control  

2. Adjustment of the mA and/or kV according to patient size  

3. Use of iterative reconstruction technique

## 2017-05-08 NOTE — HP
ADMIT DATE:  5/8/17



CHIEF COMPLAINT:  Dizziness.



HISTORY OF PRESENT ILLNESS:  The patient is a 66-year-old -American

gentleman with past medical history of hypertension and osteoarthritis, who

presented to the Emergency Room with sudden onset of dizziness, lightheadedness,

and unsteady gait.  He relates that [facility],

but also at the Emergency Room here in the past week with severe left-sided hip

pain.  With his visit in the ER yesterday, he received Medrol Dosepak and Norco,

which seemed to address the pain fairly decently.  His electrolytes have been

checked during his visit.  He relates that about 10 days ago, he also started on

a new blood pressure medication, Norvasc.  He denies any lightheadedness,

dizziness before yesterday, but is convinced that Norvasc is at the root of all

his issues.



In the Emergency Room, he was found with a sodium of 117, potassium at 6.5,

creatinine at 2.4 and he was promptly admitted.  He received IV fluids,

Kayexalate as well as Lasix to address his electrolyte imbalances.  Potassium

improved to 6.1.  In addition, he was found bradycardic in a junctional rhythm,

which was attributed to the hyperkalemia.  He was admitted to the ICU for

further monitoring.



PAST MEDICAL HISTORY:  Hypertension and osteoarthritis.



FAMILY HISTORY:  Hypertension, osteoarthritis.  Denies any heart disease, denies

any kidney disease known in the family.



SOCIAL HISTORY:  Lives with his wife, smokes about a pack a day since age 24. 

Admits to 1 bottle of wine a day.  Denies any other drugs.



ALLERGIES:  TRAZODONE, UNKNOWN REACTION and he denies any allergies.



HOME MEDICATIONS:  Reconciled with MAR.



REVIEW OF SYSTEMS:  Lightheadedness/dizziness, now has resolved.  He feels

perfectly fine and is eager to leave the hospital.



PHYSICAL EXAMINATION:

VITAL SIGNS:  From today show a blood pressure of 141/71, heart rate of 74,

respiratory rate at 23.  He is afebrile.

GENERAL:  This is a well-nourished, well-developed, 66-year-old,

-American gentleman, alert and oriented, in no acute distress.

HEENT:  Shows no scleral icterus.

NECK:  Supple, without any lymphadenopathy.

LUNGS:  Clear to auscultation bilaterally.

HEART:  Regular rate and rhythm.

ABDOMEN:  Has positive bowel sounds, soft, nontender.

EXTREMITIES:  Show no edema.

SKIN:  Warm, soft and dry without any rash.



LABORATORY DATA:  CBC with a WBC of 15.1, hemoglobin 10.3, MCV at 82, platelets

of 282.  Chemistries with a BUN and creatinine currently at 27 and 1.9, at

admission 29 and 2.4, sodium of 125 (at admission, 117), potassium 5.3 (6.5),

calcium at 8.7.  LFTs at admission within normal limits.



IMAGING:  Chest x-ray shows no acute cardiopulmonary process.



ASSESSMENT AND PLAN:  The patient is a 66-year-old, -American, gentleman

presenting with hyponatremia, severe as well as hyperkalemia, severe and

resulting bradycardia with junctional rhythm.  Etiology of abnormality is

unclear at this point.  With what appears to be acute-on-chronic renal

insufficiency, we will obtain a consult with a Nephrology (Dr. Lee). 

Cardiology, Dr. Cerda, has been consulted as well.  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

improving.  We will continue normal saline drip for the time being.  We will

obtain additional labs including TSH, lipid profile for cardiology evaluation as

well.  We will continue his home medications, Norvasc to be added in his

hypertension resumes.



We will obtain a CT of the chest and echocardiogram to help evaluate cardiac and

pulmonary function and rule out potential malignancy as etiology of SIADH. 

Prophylaxis will be instituted with H2 blocker and heparin.



For his substance abuse including tobacco and alcohol, he prefers nicotine gum

over patch.  We will also start him on Ativan prevention protocol.

 



______________________________

FLORENTINO WALLER MD



DR:  PROSPER/nts  JOB#:  [id number] / [id number]

DD:  05/08/2017 10:32  DT:  05/08/2017 11:49

NANDO

## 2017-05-08 NOTE — PDOC2
CONSULT


Date of Consult


Date of Consult


DATE: 5/8/17 


TIME: 15:56





Reason for Consult


Reason for Consult:


MARY





Referring Physician


Referring Physician:


SAIDA





Identification/Chief Complaint


Chief Complaint


THIS IS A 66 YR OLD ADMITTED WITH FEELING POORLY. HE STATES THAT HE FELT DIZZY. 

ON ADMIT HE IS NOTED TO HAVE HYPONATREMIA, HYPERKALEMIA AND MARY WITH A CR OF 

2.4. NO CKD HX IS REPORTED. HE IS ALSO NOTED TO HAVE BRADYCARDIA. HIS K WAS 6.5 

AND HIS NA 





Source


Source:  Chart review, Patient





History of Present Illness


Reason for Visit:


AS ABOVE





Past Medical History


Cardiovascular:  HTN


Musculoskeletal:  Osteoarthritis





Family History


Family History:  Hypertension





Social History


1 pack per day


ALCOHOL:  heavy


Drugs:  None


Lives:  with Family





Current Problem List


Problem List


Problems


Medical Problems:


(1) Bradycardia


Status: Acute  





(2) Hyperkalemia


Status: Acute  











Current Medications


Current Medications





Current Medications


Sodium Chloride 1,000 ml @  1,000 mls/hr Q1H IV  Last administered on 5/7/17at 

17:14;  Start 5/7/17 at 17:15;  Stop 5/7/17 at 18:14;  Status DC


Sodium Polystyrene Sulfonate (Kayexalate) 15 gm 1X  ONCE PO  Last administered 

on 5/7/17at 18:27;  Start 5/7/17 at 18:15;  Stop 5/7/17 at 18:16;  Status DC


Sodium Chloride 1,000 ml @  1,000 mls/hr 1X  ONCE IV  Last administered on 5/7/ 17at 20:26;  Start 5/7/17 at 20:30;  Stop 5/7/17 at 21:29;  Status DC


Furosemide (Lasix) 40 mg 1X  ONCE IVP  Last administered on 5/7/17at 20:26;  

Start 5/7/17 at 20:30;  Stop 5/7/17 at 20:31;  Status DC


Sodium Chloride (Normal Saline Flush) 3 ml QSHIFT  PRN IV AFTER MEDS AND BLOOD 

DRAWS;  Start 5/8/17 at 09:30


Citalopram Hydrobromide (CeleXA) 20 mg DAILY PO  Last administered on 5/8/17at 

10:50;  Start 5/8/17 at 11:00


Acetaminophen/ Hydrocodone Bitart (Lortab 5/325) 1 tab PRN Q6HRS  PRN PO PAIN 

Last administered on 5/8/17at 10:50;  Start 5/8/17 at 10:15


Lisinopril (Prinivil) 20 mg DAILY PO  Last administered on 5/8/17at 10:50;  

Start 5/8/17 at 11:00


Meloxicam (Mobic) 7.5 mg BID PO  Last administered on 5/8/17at 10:49;  Start 5/8 /17 at 11:00


Quetiapine Fumarate (SEROquel) 25 mg HS PO ;  Start 5/8/17 at 21:00


Non-Formulary Medication 1 pkg UD PO ;  Start 5/8/17 at 10:15;  Status UNV


Pantoprazole Sodium (Protonix) 40 mg DAILYAC PO  Last administered on 5/8/17at 

10:49;  Start 5/8/17 at 11:00


Multivitamins/ Minerals (I-Harjit) 1 tab DAILY PO  Last administered on 5/8/17at 

10:49;  Start 5/8/17 at 11:00


Nicotine Polacrilex (Nicorette Gum) 1 each PRN Q2HR  PRN BC SMOKING CESSATION;  

Start 5/8/17 at 10:15


Morphine Sulfate 1 mg 1X  ONCE IM ;  Start 5/8/17 at 10:45;  Stop 5/8/17 at 10:

45;  Status DC


Sodium Chloride 1,000 ml @  100 mls/hr Q10H IV  Last administered on 5/8/17at 10

:48;  Start 5/8/17 at 10:15


Methylprednisolone (Medrol) 4 mg TIDPC PO  Last administered on 5/8/17at 13:02;

  Start 5/8/17 at 11:00;  Stop 5/8/17 at 17:31


Methylprednisolone (Medrol) 8 mg QHS PO ;  Start 5/8/17 at 21:00;  Stop 5/8/17 

at 21:01


Methylprednisolone (Medrol) 4 mg QIDAFTMEAL PO ;  Start 5/9/17 at 09:00;  Stop 5 /9/17 at 21:01


Methylprednisolone (Medrol) 4 mg TID PO ;  Start 5/10/17 at 09:00;  Stop 5/10/

17 at 21:01


Methylprednisolone (Medrol) 4 mg BID PO ;  Start 5/11/17 at 09:00;  Stop 5/11/ 17 at 21:01


Methylprednisolone (Medrol) 4 mg DAILY PO ;  Start 5/12/17 at 09:00;  Stop 5/12/ 17 at 09:01


Morphine Sulfate 1 mg 1X  ONCE IV  Last administered on 5/8/17at 10:48;  Start 5 /8/17 at 10:45;  Stop 5/8/17 at 10:46;  Status DC





Active Scripts


Active


Medrol (Methylprednisolone) 4 Mg Tab.ds.pk 1 Pkg PO UD


Norco 5-325 Tablet (Acetaminophen/Hydrocodone Bitart) 1 Each Tablet 1 Tab PO 

PRN Q6HRS PRN


Reported


Celexa (Citalopram Hydrobromide) 20 Mg Tablet 1 Tab PO DAILY


Seroquel (Quetiapine Fumarate) 25 Mg Tablet 20 Mg PO HS


Omeprazole 20 Mg Tablet.dr 1 Tab PO DAILY


Amlodipine Besylate 10 Mg Tablet 10 Mg PO DAILY


Lisinopril 20 Mg Tablet 1 Tab PO DAILY


Meloxicam 7.5 Mg Tablet 7.5 Mg PO BID


Multivitamins (Multivitamin) 1 Each Capsule 1 Each PO





Allergies


Allergies:  


Coded Allergies:  


     trazodone (Verified  Allergy, Intermediate, 6/4/16)





ROS


General:  YES: Appetite


PSYCHOLOGICAL ROS:  YES: Anxiety


Eyes:  Yes Decreased vision


HEENT:  YES: Heacaches


Respiratory:  YES: Cough


Gastrointestinal:  Yes Constipation


Genitourinary:  YES Retention, YES Other (NOCTURIA)


Musculoskeletal:  Yes Muscular Weakness





Physical Exam


General:  Alert, Oriented X3, Cooperative, No acute distress


HEENT:  Atraumatic, PERRLA


Lungs:  Clear to auscultation, Normal air movement


Heart:  Regular rate


Abdomen:  Soft, No tenderness


Neuro:  Normal speech, Cranial nerves 3-12 NL


Psych/Mental Status:  Mental status NL, Mood NL


MUSCULOSKELETAL:  No deformity





Vitals


VITALS





Vital Signs








  Date Time  Temp Pulse Resp B/P (MAP) Pulse Ox O2 Delivery O2 Flow Rate FiO2


 


5/8/17 12:30     95 Room Air  


 


5/8/17 12:00 98.9 67 20 127/78 (94)    





 98.9       


 


5/8/17 06:00       2.0 











Labs


Labs





Laboratory Tests








Test


  5/7/17


16:16 5/7/17


16:35 5/7/17


20:40 5/7/17


22:13


 


Urine Collection Type Unknown    


 


Urine Color Yellow    


 


Urine Clarity Cloudy    


 


Urine pH 5.0    


 


Urine Specific Gravity 1.020    


 


Urine Protein


  30 mg/dL


(NEG-TRACE) 


  


  


 


 


Urine Glucose (UA)


  Negative mg/dL


(NEG) 


  


  


 


 


Urine Ketones (Stick)


  Negative mg/dL


(NEG) 


  


  


 


 


Urine Blood Negative (NEG)    


 


Urine Nitrite Negative (NEG)    


 


Urine Bilirubin Negative (NEG)    


 


Urine Urobilinogen Dipstick


  0.2 mg/dL (0.2


mg/dL) 


  


  


 


 


Urine Leukocyte Esterase Negative (NEG)    


 


Urine RBC 0 /HPF (0-2)    


 


Urine WBC Occ /HPF (0-4)    


 


Urine Squamous Epithelial


Cells Occ /LPF 


  


  


  


 


 


Urine Bacteria 0 /HPF (0-FEW)    


 


Urine Hyaline Casts Many /HPF    


 


Urine Mucus Slight /LPF    


 


White Blood Count


  


  15.1 x10^3/uL


(4.0-11.0) 


  


 


 


Red Blood Count


  


  3.78 x10^6/uL


(4.30-5.70) 


  


 


 


Hemoglobin


  


  10.3 g/dL


(13.0-17.5) 


  


 


 


Hematocrit


  


  30.9 %


(39.0-53.0) 


  


 


 


Mean Corpuscular Volume  82 fL ()   


 


Mean Corpuscular Hemoglobin  27 pg (25-35)   


 


Mean Corpuscular Hemoglobin


Concent 


  33 g/dL


(31-37) 


  


 


 


Red Cell Distribution Width


  


  16.3 %


(11.5-14.5) 


  


 


 


Platelet Count


  


  282 x10^3/uL


(140-400) 


  


 


 


Neutrophils (%) (Auto)  84 % (31-73)   


 


Lymphocytes (%) (Auto)  9 % (24-48)   


 


Monocytes (%) (Auto)  7 % (0-9)   


 


Eosinophils (%) (Auto)  0 % (0-3)   


 


Basophils (%) (Auto)  0 % (0-3)   


 


Neutrophils # (Auto)


  


  12.7 x10^3uL


(1.8-7.7) 


  


 


 


Lymphocytes # (Auto)


  


  1.4 x10^3/uL


(1.0-4.8) 


  


 


 


Monocytes # (Auto)


  


  1.0 x10^3/uL


(0.0-1.1) 


  


 


 


Eosinophils # (Auto)


  


  0.0 x10^3/uL


(0.0-0.7) 


  


 


 


Basophils # (Auto)


  


  0.0 x10^3/uL


(0.0-0.2) 


  


 


 


Segmented Neutrophils %  73 % (35-66)   


 


Band Neutrophils %  3 % (0-9)   


 


Lymphocytes %  17 % (24-48)   


 


Monocytes %  7 % (0-10)   


 


Platelet Estimate


  


  Adequate


(ADEQUATE) 


  


 


 


Polychromasia  Slight   


 


Sodium Level


  


  117 mmol/L


(136-145) 120 mmol/L


(136-145) 


 


 


Potassium Level


  


  6.5 mmol/L


(3.5-5.1) 6.1 mmol/L


(3.5-5.1) 


 


 


Chloride Level


  


  86 mmol/L


() 89 mmol/L


() 


 


 


Carbon Dioxide Level


  


  23 mmol/L


(21-32) 22 mmol/L


(21-32) 


 


 


Anion Gap  8 (6-14)  9 (6-14)  


 


Blood Urea Nitrogen


  


  29 mg/dL


(8-26) 31 mg/dL


(8-26) 


 


 


Creatinine


  


  2.4 mg/dL


(0.7-1.3) 2.3 mg/dL


(0.7-1.3) 


 


 


Estimated GFR


(Cockcroft-Gault) 


  33.0 


  34.7 


  


 


 


Glucose Level


  


  126 mg/dL


(70-99) 119 mg/dL


(70-99) 


 


 


Calcium Level


  


  8.3 mg/dL


(8.5-10.1) 7.7 mg/dL


(8.5-10.1) 


 


 


Magnesium Level


  


  2.1 mg/dL


(1.8-2.4) 


  


 


 


Total Bilirubin


  


  0.6 mg/dL


(0.2-1.0) 


  


 


 


Direct Bilirubin


  


  0.2 mg/dL


(0.0-0.2) 


  


 


 


Aspartate Amino Transf


(AST/SGOT) 


  26 U/L (15-37) 


  


  


 


 


Alanine Aminotransferase


(ALT/SGPT) 


  29 U/L (16-63) 


  


  


 


 


Alkaline Phosphatase


  


  59 U/L


() 


  


 


 


Troponin I Quantitative


  


  < 0.017 ng/mL


(0.000-0.055) 


  


 


 


NT-Pro-B-Type Natriuretic


Peptide 


  2216 pg/mL


(0-124) 


  


 


 


Total Protein


  


  7.1 g/dL


(6.4-8.2) 


  


 


 


Albumin


  


  3.5 g/dL


(3.4-5.0) 


  


 


 


Nasal Screen MRSA (PCR)


  


  


  


  Negative


(Negative)


 


Test


  5/8/17


05:35 5/8/17


13:15 


  


 


 


Sodium Level


  125 mmol/L


(136-145) 129 mmol/L


(136-145) 


  


 


 


Potassium Level


  5.3 mmol/L


(3.5-5.1) 4.8 mmol/L


(3.5-5.1) 


  


 


 


Chloride Level


  93 mmol/L


() 95 mmol/L


() 


  


 


 


Carbon Dioxide Level


  24 mmol/L


(21-32) 24 mmol/L


(21-32) 


  


 


 


Anion Gap 8 (6-14)  10 (6-14)   


 


Blood Urea Nitrogen


  27 mg/dL


(8-26) 26 mg/dL


(8-26) 


  


 


 


Creatinine


  1.9 mg/dL


(0.7-1.3) 1.6 mg/dL


(0.7-1.3) 


  


 


 


Estimated GFR


(Cockcroft-Gault) 43.3 


  52.8 


  


  


 


 


Glucose Level


  101 mg/dL


(70-99) 121 mg/dL


(70-99) 


  


 


 


Calcium Level


  8.7 mg/dL


(8.5-10.1) 9.2 mg/dL


(8.5-10.1) 


  


 


 


Thyroid Stimulating Hormone


(TSH) 0.338 uIU/mL


(0.358-3.74) 


  


  


 








Laboratory Tests








Test


  5/7/17


16:16 5/7/17


16:35 5/7/17


20:40 5/7/17


22:13


 


Urine Collection Type Unknown    


 


Urine Color Yellow    


 


Urine Clarity Cloudy    


 


Urine pH 5.0    


 


Urine Specific Gravity 1.020    


 


Urine Protein


  30 mg/dL


(NEG-TRACE) 


  


  


 


 


Urine Glucose (UA)


  Negative mg/dL


(NEG) 


  


  


 


 


Urine Ketones (Stick)


  Negative mg/dL


(NEG) 


  


  


 


 


Urine Blood Negative (NEG)    


 


Urine Nitrite Negative (NEG)    


 


Urine Bilirubin Negative (NEG)    


 


Urine Urobilinogen Dipstick


  0.2 mg/dL (0.2


mg/dL) 


  


  


 


 


Urine Leukocyte Esterase Negative (NEG)    


 


Urine RBC 0 /HPF (0-2)    


 


Urine WBC Occ /HPF (0-4)    


 


Urine Squamous Epithelial


Cells Occ /LPF 


  


  


  


 


 


Urine Bacteria 0 /HPF (0-FEW)    


 


Urine Hyaline Casts Many /HPF    


 


Urine Mucus Slight /LPF    


 


White Blood Count


  


  15.1 x10^3/uL


(4.0-11.0) 


  


 


 


Red Blood Count


  


  3.78 x10^6/uL


(4.30-5.70) 


  


 


 


Hemoglobin


  


  10.3 g/dL


(13.0-17.5) 


  


 


 


Hematocrit


  


  30.9 %


(39.0-53.0) 


  


 


 


Mean Corpuscular Volume  82 fL ()   


 


Mean Corpuscular Hemoglobin  27 pg (25-35)   


 


Mean Corpuscular Hemoglobin


Concent 


  33 g/dL


(31-37) 


  


 


 


Red Cell Distribution Width


  


  16.3 %


(11.5-14.5) 


  


 


 


Platelet Count


  


  282 x10^3/uL


(140-400) 


  


 


 


Neutrophils (%) (Auto)  84 % (31-73)   


 


Lymphocytes (%) (Auto)  9 % (24-48)   


 


Monocytes (%) (Auto)  7 % (0-9)   


 


Eosinophils (%) (Auto)  0 % (0-3)   


 


Basophils (%) (Auto)  0 % (0-3)   


 


Neutrophils # (Auto)


  


  12.7 x10^3uL


(1.8-7.7) 


  


 


 


Lymphocytes # (Auto)


  


  1.4 x10^3/uL


(1.0-4.8) 


  


 


 


Monocytes # (Auto)


  


  1.0 x10^3/uL


(0.0-1.1) 


  


 


 


Eosinophils # (Auto)


  


  0.0 x10^3/uL


(0.0-0.7) 


  


 


 


Basophils # (Auto)


  


  0.0 x10^3/uL


(0.0-0.2) 


  


 


 


Segmented Neutrophils %  73 % (35-66)   


 


Band Neutrophils %  3 % (0-9)   


 


Lymphocytes %  17 % (24-48)   


 


Monocytes %  7 % (0-10)   


 


Platelet Estimate


  


  Adequate


(ADEQUATE) 


  


 


 


Polychromasia  Slight   


 


Sodium Level


  


  117 mmol/L


(136-145) 120 mmol/L


(136-145) 


 


 


Potassium Level


  


  6.5 mmol/L


(3.5-5.1) 6.1 mmol/L


(3.5-5.1) 


 


 


Chloride Level


  


  86 mmol/L


() 89 mmol/L


() 


 


 


Carbon Dioxide Level


  


  23 mmol/L


(21-32) 22 mmol/L


(21-32) 


 


 


Anion Gap  8 (6-14)  9 (6-14)  


 


Blood Urea Nitrogen


  


  29 mg/dL


(8-26) 31 mg/dL


(8-26) 


 


 


Creatinine


  


  2.4 mg/dL


(0.7-1.3) 2.3 mg/dL


(0.7-1.3) 


 


 


Estimated GFR


(Cockcroft-Gault) 


  33.0 


  34.7 


  


 


 


Glucose Level


  


  126 mg/dL


(70-99) 119 mg/dL


(70-99) 


 


 


Calcium Level


  


  8.3 mg/dL


(8.5-10.1) 7.7 mg/dL


(8.5-10.1) 


 


 


Magnesium Level


  


  2.1 mg/dL


(1.8-2.4) 


  


 


 


Total Bilirubin


  


  0.6 mg/dL


(0.2-1.0) 


  


 


 


Direct Bilirubin


  


  0.2 mg/dL


(0.0-0.2) 


  


 


 


Aspartate Amino Transf


(AST/SGOT) 


  26 U/L (15-37) 


  


  


 


 


Alanine Aminotransferase


(ALT/SGPT) 


  29 U/L (16-63) 


  


  


 


 


Alkaline Phosphatase


  


  59 U/L


() 


  


 


 


Troponin I Quantitative


  


  < 0.017 ng/mL


(0.000-0.055) 


  


 


 


NT-Pro-B-Type Natriuretic


Peptide 


  2216 pg/mL


(0-124) 


  


 


 


Total Protein


  


  7.1 g/dL


(6.4-8.2) 


  


 


 


Albumin


  


  3.5 g/dL


(3.4-5.0) 


  


 


 


Nasal Screen MRSA (PCR)


  


  


  


  Negative


(Negative)


 


Test


  5/8/17


05:35 5/8/17


13:15 


  


 


 


Sodium Level


  125 mmol/L


(136-145) 129 mmol/L


(136-145) 


  


 


 


Potassium Level


  5.3 mmol/L


(3.5-5.1) 4.8 mmol/L


(3.5-5.1) 


  


 


 


Chloride Level


  93 mmol/L


() 95 mmol/L


() 


  


 


 


Carbon Dioxide Level


  24 mmol/L


(21-32) 24 mmol/L


(21-32) 


  


 


 


Anion Gap 8 (6-14)  10 (6-14)   


 


Blood Urea Nitrogen


  27 mg/dL


(8-26) 26 mg/dL


(8-26) 


  


 


 


Creatinine


  1.9 mg/dL


(0.7-1.3) 1.6 mg/dL


(0.7-1.3) 


  


 


 


Estimated GFR


(Cockcroft-Gault) 43.3 


  52.8 


  


  


 


 


Glucose Level


  101 mg/dL


(70-99) 121 mg/dL


(70-99) 


  


 


 


Calcium Level


  8.7 mg/dL


(8.5-10.1) 9.2 mg/dL


(8.5-10.1) 


  


 


 


Thyroid Stimulating Hormone


(TSH) 0.338 uIU/mL


(0.358-3.74) 


  


  


 











Assessment/Plan


Assessment/Plan


IMP





MARY


PROB ATN


HYPOTENSION


HYPONATREMIA


HYPERKALEMIA


BRADYCARDIA


URINARY RETENTION





PLAN





HOLD ACE-I


HOLD MOBIC


ISOTONIC SALINE


CARDIOLOGY EVAL AND TX


RENAL SONOGRAM


WILL FOLLOW











MELI VILLEDA MD May 8, 2017 16:04

## 2017-05-08 NOTE — PDOC2
CONSULT


Date of Consult


Date of Consult


DATE: 5/8/17 


TIME: 18:49





Reason for Consult


Reason for Consult:


Bradycardia





Referring Physician


Referring Physician:


Dr. Trevino





Identification/Chief Complaint


Chief Complaint


Hypotension





History of Present Illness


Reason for Visit:


This patient is a 66-year-old gentleman that has a known history of 

hypertension arthritis and is a smoker and has a known history of EtOH abuse.





The patient has been seen at this institution for a recent visit in the 

emergency room when he was having a lot of hip pain.





He was treated medically and discharged. Usually he is seen at the [facility].





[facility] and was started on amlodipine for his high blood 

pressure and he has been noticing that he has been getting weak and lightheaded 

and his blood pressure has been 





running very low due to that he came into the ER with a low blood pressure and 

near syncope.





After he arrived in the ER he was found to be hypotensive, hyponatremic with a 

sodium of 117 and hypokalemic with a potassium of 6.5. The patient was 

bradycardic in what appeared to be a junctional 





bradycardia at that point.





He was treated medically with IV fluids and Kayexalate and Lasix.





Today his blood pressure is doing better and he is in sinus rhythm with a rate 

in the 70s.





The electrolytes have improved.





The patient denies having any history of any heart problems in the past.





No chest pains, no palpitations, no loss of consciousness.





Past Medical History


Cardiovascular:  HTN


Musculoskeletal:  Osteoarthritis





Family History


Family History:  Hypertension





Social History


1 pack per day


ALCOHOL:  heavy


Drugs:  None


Lives:  with Family





Current Problem List


Problem List


Problems


Medical Problems:


(1) Bradycardia


Status: Acute  





(2) Hyperkalemia


Status: Acute  











Current Medications


Current Medications





Current Medications


Sodium Chloride 1,000 ml @  1,000 mls/hr Q1H IV  Last administered on 5/7/17at 

17:14;  Start 5/7/17 at 17:15;  Stop 5/7/17 at 18:14;  Status DC


Sodium Polystyrene Sulfonate (Kayexalate) 15 gm 1X  ONCE PO  Last administered 

on 5/7/17at 18:27;  Start 5/7/17 at 18:15;  Stop 5/7/17 at 18:16;  Status DC


Sodium Chloride 1,000 ml @  1,000 mls/hr 1X  ONCE IV  Last administered on 5/7/ 17at 20:26;  Start 5/7/17 at 20:30;  Stop 5/7/17 at 21:29;  Status DC


Furosemide (Lasix) 40 mg 1X  ONCE IVP  Last administered on 5/7/17at 20:26;  

Start 5/7/17 at 20:30;  Stop 5/7/17 at 20:31;  Status DC


Sodium Chloride (Normal Saline Flush) 3 ml QSHIFT  PRN IV AFTER MEDS AND BLOOD 

DRAWS;  Start 5/8/17 at 09:30


Citalopram Hydrobromide (CeleXA) 20 mg DAILY PO  Last administered on 5/8/17at 

10:50;  Start 5/8/17 at 11:00


Acetaminophen/ Hydrocodone Bitart (Lortab 5/325) 1 tab PRN Q6HRS  PRN PO PAIN 

Last administered on 5/8/17at 17:53;  Start 5/8/17 at 10:15


Lisinopril (Prinivil) 20 mg DAILY PO  Last administered on 5/8/17at 10:50;  

Start 5/8/17 at 11:00


Meloxicam (Mobic) 7.5 mg BID PO  Last administered on 5/8/17at 10:49;  Start 5/8 /17 at 11:00


Quetiapine Fumarate (SEROquel) 25 mg HS PO ;  Start 5/8/17 at 21:00


Non-Formulary Medication 1 pkg UD PO ;  Start 5/8/17 at 10:15;  Status UNV


Pantoprazole Sodium (Protonix) 40 mg DAILYAC PO  Last administered on 5/8/17at 

10:49;  Start 5/8/17 at 11:00


Multivitamins/ Minerals (I-Harjit) 1 tab DAILY PO  Last administered on 5/8/17at 

10:49;  Start 5/8/17 at 11:00


Nicotine Polacrilex (Nicorette Gum) 1 each PRN Q2HR  PRN BC SMOKING CESSATION;  

Start 5/8/17 at 10:15


Morphine Sulfate 1 mg 1X  ONCE IM ;  Start 5/8/17 at 10:45;  Stop 5/8/17 at 10:

45;  Status DC


Sodium Chloride 1,000 ml @  100 mls/hr Q10H IV  Last administered on 5/8/17at 10

:48;  Start 5/8/17 at 10:15


Methylprednisolone (Medrol) 4 mg TIDPC PO  Last administered on 5/8/17at 17:53;

  Start 5/8/17 at 11:00;  Stop 5/8/17 at 17:31;  Status DC


Methylprednisolone (Medrol) 8 mg QHS PO ;  Start 5/8/17 at 21:00;  Stop 5/8/17 

at 21:01


Methylprednisolone (Medrol) 4 mg QIDAFTMEAL PO ;  Start 5/9/17 at 09:00;  Stop 5 /9/17 at 21:01


Methylprednisolone (Medrol) 4 mg TID PO ;  Start 5/10/17 at 09:00;  Stop 5/10/

17 at 21:01


Methylprednisolone (Medrol) 4 mg BID PO ;  Start 5/11/17 at 09:00;  Stop 5/11/ 17 at 21:01


Methylprednisolone (Medrol) 4 mg DAILY PO ;  Start 5/12/17 at 09:00;  Stop 5/12/ 17 at 09:01


Morphine Sulfate 1 mg 1X  ONCE IV  Last administered on 5/8/17at 10:48;  Start 5 /8/17 at 10:45;  Stop 5/8/17 at 10:46;  Status DC





Active Scripts


Active


Medrol (Methylprednisolone) 4 Mg Tab.ds.pk 1 Pkg PO UD


Norco 5-325 Tablet (Acetaminophen/Hydrocodone Bitart) 1 Each Tablet 1 Tab PO 

PRN Q6HRS PRN


Reported


Celexa (Citalopram Hydrobromide) 20 Mg Tablet 1 Tab PO DAILY


Seroquel (Quetiapine Fumarate) 25 Mg Tablet 20 Mg PO HS


Omeprazole 20 Mg Tablet.dr 1 Tab PO DAILY


Amlodipine Besylate 10 Mg Tablet 10 Mg PO DAILY


Lisinopril 20 Mg Tablet 1 Tab PO DAILY


Meloxicam 7.5 Mg Tablet 7.5 Mg PO BID


Multivitamins (Multivitamin) 1 Each Capsule 1 Each PO





Allergies


Allergies:  


Coded Allergies:  


     trazodone (Verified  Allergy, Intermediate, 6/4/16)





Physical Exam


General:  Alert, Oriented X3, Cooperative


HEENT:  Atraumatic, PERRLA


Lungs:  Clear to auscultation


Heart:  Regular rate, Normal S1, Normal S2


Abdomen:  Normal bowel sounds, Soft


Extremities:  No edema





Vitals


VITALS





Vital Signs








  Date Time  Temp Pulse Resp B/P (MAP) Pulse Ox O2 Delivery O2 Flow Rate FiO2


 


5/8/17 17:53      Room Air  


 


5/8/17 16:00 98.5 80 22 113/75 (88) 94   





 98.5       


 


5/8/17 06:00       2.0 











Labs


Labs





Laboratory Tests








Test


  5/7/17


16:16 5/7/17


16:35 5/7/17


20:40 5/7/17


22:13


 


Urine Collection Type Unknown    


 


Urine Color Yellow    


 


Urine Clarity Cloudy    


 


Urine pH 5.0    


 


Urine Specific Gravity 1.020    


 


Urine Protein


  30 mg/dL


(NEG-TRACE) 


  


  


 


 


Urine Glucose (UA)


  Negative mg/dL


(NEG) 


  


  


 


 


Urine Ketones (Stick)


  Negative mg/dL


(NEG) 


  


  


 


 


Urine Blood Negative (NEG)    


 


Urine Nitrite Negative (NEG)    


 


Urine Bilirubin Negative (NEG)    


 


Urine Urobilinogen Dipstick


  0.2 mg/dL (0.2


mg/dL) 


  


  


 


 


Urine Leukocyte Esterase Negative (NEG)    


 


Urine RBC 0 /HPF (0-2)    


 


Urine WBC Occ /HPF (0-4)    


 


Urine Squamous Epithelial


Cells Occ /LPF 


  


  


  


 


 


Urine Bacteria 0 /HPF (0-FEW)    


 


Urine Hyaline Casts Many /HPF    


 


Urine Mucus Slight /LPF    


 


White Blood Count


  


  15.1 x10^3/uL


(4.0-11.0) 


  


 


 


Red Blood Count


  


  3.78 x10^6/uL


(4.30-5.70) 


  


 


 


Hemoglobin


  


  10.3 g/dL


(13.0-17.5) 


  


 


 


Hematocrit


  


  30.9 %


(39.0-53.0) 


  


 


 


Mean Corpuscular Volume  82 fL ()   


 


Mean Corpuscular Hemoglobin  27 pg (25-35)   


 


Mean Corpuscular Hemoglobin


Concent 


  33 g/dL


(31-37) 


  


 


 


Red Cell Distribution Width


  


  16.3 %


(11.5-14.5) 


  


 


 


Platelet Count


  


  282 x10^3/uL


(140-400) 


  


 


 


Neutrophils (%) (Auto)  84 % (31-73)   


 


Lymphocytes (%) (Auto)  9 % (24-48)   


 


Monocytes (%) (Auto)  7 % (0-9)   


 


Eosinophils (%) (Auto)  0 % (0-3)   


 


Basophils (%) (Auto)  0 % (0-3)   


 


Neutrophils # (Auto)


  


  12.7 x10^3uL


(1.8-7.7) 


  


 


 


Lymphocytes # (Auto)


  


  1.4 x10^3/uL


(1.0-4.8) 


  


 


 


Monocytes # (Auto)


  


  1.0 x10^3/uL


(0.0-1.1) 


  


 


 


Eosinophils # (Auto)


  


  0.0 x10^3/uL


(0.0-0.7) 


  


 


 


Basophils # (Auto)


  


  0.0 x10^3/uL


(0.0-0.2) 


  


 


 


Segmented Neutrophils %  73 % (35-66)   


 


Band Neutrophils %  3 % (0-9)   


 


Lymphocytes %  17 % (24-48)   


 


Monocytes %  7 % (0-10)   


 


Platelet Estimate


  


  Adequate


(ADEQUATE) 


  


 


 


Polychromasia  Slight   


 


Sodium Level


  


  117 mmol/L


(136-145) 120 mmol/L


(136-145) 


 


 


Potassium Level


  


  6.5 mmol/L


(3.5-5.1) 6.1 mmol/L


(3.5-5.1) 


 


 


Chloride Level


  


  86 mmol/L


() 89 mmol/L


() 


 


 


Carbon Dioxide Level


  


  23 mmol/L


(21-32) 22 mmol/L


(21-32) 


 


 


Anion Gap  8 (6-14)  9 (6-14)  


 


Blood Urea Nitrogen


  


  29 mg/dL


(8-26) 31 mg/dL


(8-26) 


 


 


Creatinine


  


  2.4 mg/dL


(0.7-1.3) 2.3 mg/dL


(0.7-1.3) 


 


 


Estimated GFR


(Cockcroft-Gault) 


  33.0 


  34.7 


  


 


 


Glucose Level


  


  126 mg/dL


(70-99) 119 mg/dL


(70-99) 


 


 


Calcium Level


  


  8.3 mg/dL


(8.5-10.1) 7.7 mg/dL


(8.5-10.1) 


 


 


Magnesium Level


  


  2.1 mg/dL


(1.8-2.4) 


  


 


 


Total Bilirubin


  


  0.6 mg/dL


(0.2-1.0) 


  


 


 


Direct Bilirubin


  


  0.2 mg/dL


(0.0-0.2) 


  


 


 


Aspartate Amino Transf


(AST/SGOT) 


  26 U/L (15-37) 


  


  


 


 


Alanine Aminotransferase


(ALT/SGPT) 


  29 U/L (16-63) 


  


  


 


 


Alkaline Phosphatase


  


  59 U/L


() 


  


 


 


Troponin I Quantitative


  


  < 0.017 ng/mL


(0.000-0.055) 


  


 


 


NT-Pro-B-Type Natriuretic


Peptide 


  2216 pg/mL


(0-124) 


  


 


 


Total Protein


  


  7.1 g/dL


(6.4-8.2) 


  


 


 


Albumin


  


  3.5 g/dL


(3.4-5.0) 


  


 


 


Nasal Screen MRSA (PCR)


  


  


  


  Negative


(Negative)


 


Test


  5/8/17


05:35 5/8/17


13:15 


  


 


 


Sodium Level


  125 mmol/L


(136-145) 129 mmol/L


(136-145) 


  


 


 


Potassium Level


  5.3 mmol/L


(3.5-5.1) 4.8 mmol/L


(3.5-5.1) 


  


 


 


Chloride Level


  93 mmol/L


() 95 mmol/L


() 


  


 


 


Carbon Dioxide Level


  24 mmol/L


(21-32) 24 mmol/L


(21-32) 


  


 


 


Anion Gap 8 (6-14)  10 (6-14)   


 


Blood Urea Nitrogen


  27 mg/dL


(8-26) 26 mg/dL


(8-26) 


  


 


 


Creatinine


  1.9 mg/dL


(0.7-1.3) 1.6 mg/dL


(0.7-1.3) 


  


 


 


Estimated GFR


(Cockcroft-Gault) 43.3 


  52.8 


  


  


 


 


Glucose Level


  101 mg/dL


(70-99) 121 mg/dL


(70-99) 


  


 


 


Calcium Level


  8.7 mg/dL


(8.5-10.1) 9.2 mg/dL


(8.5-10.1) 


  


 


 


Thyroid Stimulating Hormone


(TSH) 0.338 uIU/mL


(0.358-3.74) 


  


  


 








Laboratory Tests








Test


  5/7/17


20:40 5/7/17


22:13 5/8/17


05:35 5/8/17


13:15


 


Sodium Level


  120 mmol/L


(136-145) 


  125 mmol/L


(136-145) 129 mmol/L


(136-145)


 


Potassium Level


  6.1 mmol/L


(3.5-5.1) 


  5.3 mmol/L


(3.5-5.1) 4.8 mmol/L


(3.5-5.1)


 


Chloride Level


  89 mmol/L


() 


  93 mmol/L


() 95 mmol/L


()


 


Carbon Dioxide Level


  22 mmol/L


(21-32) 


  24 mmol/L


(21-32) 24 mmol/L


(21-32)


 


Anion Gap 9 (6-14)   8 (6-14)  10 (6-14) 


 


Blood Urea Nitrogen


  31 mg/dL


(8-26) 


  27 mg/dL


(8-26) 26 mg/dL


(8-26)


 


Creatinine


  2.3 mg/dL


(0.7-1.3) 


  1.9 mg/dL


(0.7-1.3) 1.6 mg/dL


(0.7-1.3)


 


Estimated GFR


(Cockcroft-Gault) 34.7 


  


  43.3 


  52.8 


 


 


Glucose Level


  119 mg/dL


(70-99) 


  101 mg/dL


(70-99) 121 mg/dL


(70-99)


 


Calcium Level


  7.7 mg/dL


(8.5-10.1) 


  8.7 mg/dL


(8.5-10.1) 9.2 mg/dL


(8.5-10.1)


 


Nasal Screen MRSA (PCR)


  


  Negative


(Negative) 


  


 


 


Thyroid Stimulating Hormone


(TSH) 


  


  0.338 uIU/mL


(0.358-3.74) 


 











Assessment/Plan


Assessment/Plan


This patient comes in with electrolyte imbalance involving hyponatremia and 

hyperkalemia with a resulting junctional rhythm. The bradycardia is most likely 

due to the hyperkalemia.





I do not think that the amlodipine is the cause of all of this patient's 

problems but I would think that the smoking and drinking however a lot more to 

do with it than the recent start of the amlodipine.





I agree with the present plan and will get an echocardiogram to evaluate the 

left ventricular function.





Thank you very much for asking me to participate in the care of this patient











DANA LYLE MD May 8, 2017 18:55

## 2017-05-08 NOTE — RAD
Indication: Hypertension and bradycardia.



Time of exam 1704 hours.



Correlation is made with prior chest from 7/16/2012.



The heart is enlarged. Tiny calcified nodules are seen again noted throughout

both lungs. No infiltrates are seen. No effusion or pneumothorax is detected.



Impression: No acute cardiopulmonary process is detected.

## 2017-05-09 VITALS
DIASTOLIC BLOOD PRESSURE: 81 MMHG | SYSTOLIC BLOOD PRESSURE: 141 MMHG | SYSTOLIC BLOOD PRESSURE: 141 MMHG | DIASTOLIC BLOOD PRESSURE: 81 MMHG

## 2017-05-09 VITALS — SYSTOLIC BLOOD PRESSURE: 148 MMHG | DIASTOLIC BLOOD PRESSURE: 81 MMHG

## 2017-05-09 VITALS — DIASTOLIC BLOOD PRESSURE: 80 MMHG | SYSTOLIC BLOOD PRESSURE: 155 MMHG

## 2017-05-09 VITALS — SYSTOLIC BLOOD PRESSURE: 112 MMHG | DIASTOLIC BLOOD PRESSURE: 59 MMHG

## 2017-05-09 VITALS — SYSTOLIC BLOOD PRESSURE: 158 MMHG | DIASTOLIC BLOOD PRESSURE: 78 MMHG

## 2017-05-09 LAB
% SAT IRON: 16 % (ref 15–34)
ANION GAP SERPL CALC-SCNC: 10 MMOL/L (ref 6–14)
ANION GAP SERPL CALC-SCNC: 8 MMOL/L (ref 6–14)
BASOPHILS # BLD AUTO: 0 X10^3/UL (ref 0–0.2)
BASOPHILS NFR BLD: 0 % (ref 0–3)
BUN SERPL-MCNC: 21 MG/DL (ref 8–26)
BUN SERPL-MCNC: 21 MG/DL (ref 8–26)
CALCIUM SERPL-MCNC: 8.6 MG/DL (ref 8.5–10.1)
CALCIUM SERPL-MCNC: 8.9 MG/DL (ref 8.5–10.1)
CHLORIDE SERPL-SCNC: 100 MMOL/L (ref 98–107)
CHLORIDE SERPL-SCNC: 99 MMOL/L (ref 98–107)
CHOLEST SERPL-MCNC: 233 MG/DL (ref 0–200)
CHOLEST/HDLC SERPL: 5 {RATIO}
CO2 SERPL-SCNC: 24 MMOL/L (ref 21–32)
CO2 SERPL-SCNC: 25 MMOL/L (ref 21–32)
CREAT SERPL-MCNC: 1.3 MG/DL (ref 0.7–1.3)
CREAT SERPL-MCNC: 1.5 MG/DL (ref 0.7–1.3)
EOSINOPHIL NFR BLD: 0 % (ref 0–3)
ERYTHROCYTE [DISTWIDTH] IN BLOOD BY AUTOMATED COUNT: 16.2 % (ref 11.5–14.5)
FOLATE SERPL-MCNC: 15.07 NG/ML (ref 3.2–20)
GFR SERPLBLD BASED ON 1.73 SQ M-ARVRAT: 56.8 ML/MIN
GFR SERPLBLD BASED ON 1.73 SQ M-ARVRAT: 67 ML/MIN
GLUCOSE SERPL-MCNC: 113 MG/DL (ref 70–99)
GLUCOSE SERPL-MCNC: 119 MG/DL (ref 70–99)
HCT VFR BLD CALC: 33.7 % (ref 39–53)
HDLC SERPL-MCNC: 47 MG/DL (ref 40–60)
HGB BLD-MCNC: 10.8 G/DL (ref 13–17.5)
IRON SERPL-MCNC: 53 UG/DL (ref 65–175)
LYMPHOCYTES # BLD: 1.3 X10^3/UL (ref 1–4.8)
LYMPHOCYTES NFR BLD AUTO: 11 % (ref 24–48)
MCH RBC QN AUTO: 27 PG (ref 25–35)
MCHC RBC AUTO-ENTMCNC: 32 G/DL (ref 31–37)
MCV RBC AUTO: 84 FL (ref 79–100)
MONOCYTES NFR BLD: 7 % (ref 0–9)
NEUTROPHILS NFR BLD AUTO: 81 % (ref 31–73)
NONHDLC SERPL-MCNC: 186 MG/DL (ref 0–129)
PLATELET # BLD AUTO: 320 X10^3/UL (ref 140–400)
POTASSIUM SERPL-SCNC: 4.5 MMOL/L (ref 3.5–5.1)
POTASSIUM SERPL-SCNC: 5.1 MMOL/L (ref 3.5–5.1)
RBC # BLD AUTO: 4.03 X10^6/UL (ref 4.3–5.7)
SODIUM SERPL-SCNC: 133 MMOL/L (ref 136–145)
SODIUM SERPL-SCNC: 133 MMOL/L (ref 136–145)
TRIGL SERPL-MCNC: 146 MG/DL (ref 0–150)
VITAMIN-B12: 439 PG/ML (ref 247–911)
WBC # BLD AUTO: 11.5 X10^3/UL (ref 4–11)

## 2017-05-09 RX ADMIN — BACITRACIN SCH MLS/HR: 5000 INJECTION, POWDER, FOR SOLUTION INTRAMUSCULAR at 08:00

## 2017-05-09 RX ADMIN — MELOXICAM SCH MG: 7.5 TABLET ORAL at 08:02

## 2017-05-09 RX ADMIN — METHYLPREDNISOLONE SCH MG: 4 TABLET ORAL at 08:01

## 2017-05-09 RX ADMIN — PANTOPRAZOLE SODIUM SCH MG: 40 TABLET, DELAYED RELEASE ORAL at 08:01

## 2017-05-09 RX ADMIN — I-VITE, TAB 1000-60-2MG (60/BT) SCH TAB: TAB at 08:02

## 2017-05-09 RX ADMIN — CITALOPRAM HYDROBROMIDE SCH MG: 20 TABLET ORAL at 08:02

## 2017-05-09 RX ADMIN — METHYLPREDNISOLONE SCH MG: 4 TABLET ORAL at 13:51

## 2017-05-09 RX ADMIN — HYDROCODONE BITARTRATE AND ACETAMINOPHEN PRN TAB: 5; 325 TABLET ORAL at 08:02

## 2017-05-09 RX ADMIN — LISINOPRIL SCH MG: 20 TABLET ORAL at 08:01

## 2017-05-09 NOTE — PDOC
PROGRESS NOTES


Chief Complaint


Chief Complaint


Dizziness








ASSESSMENT AND PLAN:


1.  Hyponatremia:  improving.  he remains asymptomatic from neuro standpoint.


2.  Hyperkalemia:  improved, but remains borderline high.  monitor.  appreciate 

Dr Lee's input.  rpt labs at 1PM, if ok, will D/C home


4.  MARY:  vasomotor etiology.  resolved


5.  Bradycardia:  resolved.  suspect 2/2 hyperkalemia.  appreciate Dr Cerda

' input.  echo with nl EF


6.  Hypotension:  POA, resolved


7.  HTN:  borderline high on lisinopril.  add norvasc back in


9.  Hip Pain:  better.  continue recently prescribed medrol dose pack, norco PRN


8.  Tobaccoism:  nicotine gum


9.  Alcoholism:  ativan PRN.  long discussion with him:  [facility] 21d [facility] 

[facility], but declined furhter recommended inpt F/U.  his is now reconsidering.


10.  prophylaxis: SQ heparin, H2B





History of Present Illness


History of Present Illness


feels fine, no HA, dizziness, no nausea.  no SOB or CP





Vitals


Vitals





Vital Signs








  Date Time  Temp Pulse Resp B/P (MAP) Pulse Ox O2 Delivery O2 Flow Rate FiO2


 


5/9/17 08:02      Room Air  


 


5/9/17 08:01  62  148/81    


 


5/9/17 08:00 98.5  16  96   





 98.5       











Physical Exam


General:  Alert, Oriented X3, Cooperative


Heart:  Regular rate, Normal S1, Normal S2


Abdomen:  Normal bowel sounds, Soft


Extremities:  No edema





Labs


LABS





Laboratory Tests








Test


  5/8/17


13:15 5/9/17


05:35


 


Sodium Level


  129 mmol/L


(136-145) 133 mmol/L


(136-145)


 


Potassium Level


  4.8 mmol/L


(3.5-5.1) 5.1 mmol/L


(3.5-5.1)


 


Chloride Level


  95 mmol/L


() 100 mmol/L


()


 


Carbon Dioxide Level


  24 mmol/L


(21-32) 25 mmol/L


(21-32)


 


Anion Gap 10 (6-14)  8 (6-14) 


 


Blood Urea Nitrogen


  26 mg/dL


(8-26) 21 mg/dL


(8-26)


 


Creatinine


  1.6 mg/dL


(0.7-1.3) 1.3 mg/dL


(0.7-1.3)


 


Estimated GFR


(Cockcroft-Gault) 52.8 


  67.0 


 


 


Glucose Level


  121 mg/dL


(70-99) 113 mg/dL


(70-99)


 


Calcium Level


  9.2 mg/dL


(8.5-10.1) 8.9 mg/dL


(8.5-10.1)


 


White Blood Count


  


  11.5 x10^3/uL


(4.0-11.0)


 


Red Blood Count


  


  4.03 x10^6/uL


(4.30-5.70)


 


Hemoglobin


  


  10.8 g/dL


(13.0-17.5)


 


Hematocrit


  


  33.7 %


(39.0-53.0)


 


Mean Corpuscular Volume  84 fL () 


 


Mean Corpuscular Hemoglobin  27 pg (25-35) 


 


Mean Corpuscular Hemoglobin


Concent 


  32 g/dL


(31-37)


 


Red Cell Distribution Width


  


  16.2 %


(11.5-14.5)


 


Platelet Count


  


  320 x10^3/uL


(140-400)


 


Neutrophils (%) (Auto)  81 % (31-73) 


 


Lymphocytes (%) (Auto)  11 % (24-48) 


 


Monocytes (%) (Auto)  7 % (0-9) 


 


Eosinophils (%) (Auto)  0 % (0-3) 


 


Basophils (%) (Auto)  0 % (0-3) 


 


Neutrophils # (Auto)


  


  9.3 x10^3uL


(1.8-7.7)


 


Lymphocytes # (Auto)


  


  1.3 x10^3/uL


(1.0-4.8)


 


Monocytes # (Auto)


  


  0.8 x10^3/uL


(0.0-1.1)


 


Eosinophils # (Auto)


  


  0.0 x10^3/uL


(0.0-0.7)


 


Basophils # (Auto)


  


  0.0 x10^3/uL


(0.0-0.2)


 


Reticulocyte Count (auto)


  


  1.9 %


(0.5-2.5)


 


Iron Level


  


  53 ug/dL


()


 


Total Iron Binding Capacity


  


  328 ug/dL


(250-450)


 


Iron Saturation  16 % (15-34) 


 


Ferritin


  


  1469 ng/mL


()


 


Triglycerides Level


  


  146 mg/dL


(0-150)


 


Cholesterol Level


  


  233 mg/dL


(0-200)


 


LDL Cholesterol, Calculated


  


  157 mg/dL


(0-100)


 


VLDL Cholesterol, Calculated


  


  29 mg/dL


(0-40)


 


Non-HDL Cholesterol Calculated


  


  186 mg/dL


(0-129)


 


HDL Cholesterol


  


  47 mg/dL


(40-60)


 


Cholesterol/HDL Ratio  5.0 

















FLORENTINO WALLER MD May 9, 2017 08:57

## 2017-05-09 NOTE — PDOC
Renal-Progress Notes


Subjective Notes


Notes


FEELS WELL





History of Present Illness


Hx of present illness


BETTER





Vitals


Vitals





Vital Signs








  Date Time  Temp Pulse Resp B/P (MAP) Pulse Ox O2 Delivery O2 Flow Rate FiO2


 


5/9/17 09:45      Room Air  


 


5/9/17 08:01  62  148/81    


 


5/9/17 08:00 98.5  16  96   





 98.5       








Weight


Weight [ ]





I.O.


Intake and Output











Intake and Output 


 


 5/9/17





 06:59


 


Intake Total 3068 ml


 


Output Total 5650 ml


 


Balance -2582 ml


 


 


 


Intake Oral 1810 ml


 


IV Total 1258 ml


 


Output Urine Total 5650 ml











Labs


Labs





Laboratory Tests








Test


  5/8/17


13:15 5/9/17


05:35


 


Sodium Level


  129 mmol/L


(136-145) 133 mmol/L


(136-145)


 


Potassium Level


  4.8 mmol/L


(3.5-5.1) 5.1 mmol/L


(3.5-5.1)


 


Chloride Level


  95 mmol/L


() 100 mmol/L


()


 


Carbon Dioxide Level


  24 mmol/L


(21-32) 25 mmol/L


(21-32)


 


Anion Gap 10 (6-14)  8 (6-14) 


 


Blood Urea Nitrogen


  26 mg/dL


(8-26) 21 mg/dL


(8-26)


 


Creatinine


  1.6 mg/dL


(0.7-1.3) 1.3 mg/dL


(0.7-1.3)


 


Estimated GFR


(Cockcroft-Gault) 52.8 


  67.0 


 


 


Glucose Level


  121 mg/dL


(70-99) 113 mg/dL


(70-99)


 


Calcium Level


  9.2 mg/dL


(8.5-10.1) 8.9 mg/dL


(8.5-10.1)


 


White Blood Count


  


  11.5 x10^3/uL


(4.0-11.0)


 


Red Blood Count


  


  4.03 x10^6/uL


(4.30-5.70)


 


Hemoglobin


  


  10.8 g/dL


(13.0-17.5)


 


Hematocrit


  


  33.7 %


(39.0-53.0)


 


Mean Corpuscular Volume  84 fL () 


 


Mean Corpuscular Hemoglobin  27 pg (25-35) 


 


Mean Corpuscular Hemoglobin


Concent 


  32 g/dL


(31-37)


 


Red Cell Distribution Width


  


  16.2 %


(11.5-14.5)


 


Platelet Count


  


  320 x10^3/uL


(140-400)


 


Neutrophils (%) (Auto)  81 % (31-73) 


 


Lymphocytes (%) (Auto)  11 % (24-48) 


 


Monocytes (%) (Auto)  7 % (0-9) 


 


Eosinophils (%) (Auto)  0 % (0-3) 


 


Basophils (%) (Auto)  0 % (0-3) 


 


Neutrophils # (Auto)


  


  9.3 x10^3uL


(1.8-7.7)


 


Lymphocytes # (Auto)


  


  1.3 x10^3/uL


(1.0-4.8)


 


Monocytes # (Auto)


  


  0.8 x10^3/uL


(0.0-1.1)


 


Eosinophils # (Auto)


  


  0.0 x10^3/uL


(0.0-0.7)


 


Basophils # (Auto)


  


  0.0 x10^3/uL


(0.0-0.2)


 


Reticulocyte Count (auto)


  


  1.9 %


(0.5-2.5)


 


Iron Level


  


  53 ug/dL


()


 


Total Iron Binding Capacity


  


  328 ug/dL


(250-450)


 


Iron Saturation  16 % (15-34) 


 


Ferritin


  


  1469 ng/mL


()


 


Triglycerides Level


  


  146 mg/dL


(0-150)


 


Cholesterol Level


  


  233 mg/dL


(0-200)


 


LDL Cholesterol, Calculated


  


  157 mg/dL


(0-100)


 


VLDL Cholesterol, Calculated


  


  29 mg/dL


(0-40)


 


Non-HDL Cholesterol Calculated


  


  186 mg/dL


(0-129)


 


HDL Cholesterol


  


  47 mg/dL


(40-60)


 


Cholesterol/HDL Ratio  5.0 











Review of Systems


Constitutional:  yes: no symptom reported


Pulmonary:  Yes no symptom reported


Cardiovascular:  Yes no symptom reported


Musculoskeletal:  Yes: no symptom reported


Psychiatric/Neurological:  Yes: no symptom reported





Physical Exam


General Appearance:  no apparent distress


Skin:  warm


Respiratory:  bilateral CTA


Heart:  S1S2, RRR


Abdomen:  soft, bowel sounds present


Extremities:  pulses present


Neurology:  alert, oriented


Musculoskeletal:  Osteoarthritis





Assessment


Assessment


IMP





HYPERKALEMIA-BETTER


HYPONATREMIA-BETTER


MARY-BETTER


BRADYCARDIA - RESOLVED


ALCOHOLISM


HTN-CONTROLLED





PLAN





CONT SAME PLAN


D/C SOON


ENC ABSTINENCE FROM ETOH


ALSO SUGGEST STAYING OFF MOBIC AND ACE-I











MELI VILLEDA MD May 9, 2017 11:37

## 2017-05-13 NOTE — DS
DATE OF DISCHARGE:  05/09/2017



CHIEF COMPLAINT:  Dizziness.



HOSPITAL COURSE:  The patient is a 66-year-old gentleman who presented to the

Emergency Room with some dizziness.  He was found with severe hyponatremia at

117 and hyperkalemia at 6 as well as bradycardia with junctional rhythm.  The

latter was suspected to be secondary to the electrolyte derangements.  Consult

with Dr. Cerda was obtained and confirmed suspicion.  For the abnormal

electrolytes, he was given IV fluids as well as Kayexalate and Lasix.  All

numbers recovered fairly quickly.  On further questioning, it appears that the

patient is actually drinking significantly more alcohol than he admits to.  He

apparently had [facility], but had declined

further in and outpatient treatment.  In discussion with him and linking his

medical issues to his alcohol use, he admitted that he may have to go back to

re-detox and follow up for long term.



DISCHARGE PHYSICAL EXAMINATION:  Please refer to note from same day.



DISCHARGE DIAGNOSES:  Hyponatremia and hyperkalemia.



DISCHARGE DISPOSITION:  To home.



DISCHARGE CONDITION:  Improved.



DISCHARGE MEDICATIONS:  Please refer to MAR.



DISCHARGE INSTRUCTIONS:  The patient will follow up with his primary care

physician at the [facility].

 



______________________________

FLORENTINO WALLER MD



DR:  UR/nts  JOB#:  [id number] / [id number]

DD:  05/12/2017 20:20  DT:  05/13/2017 01:42



[facility] CenterKennedy

## 2017-06-13 ENCOUNTER — HOSPITAL ENCOUNTER (OUTPATIENT)
Dept: HOSPITAL 61 - KCIC MRI | Age: 67
Discharge: HOME | End: 2017-06-13
Attending: ORTHOPAEDIC SURGERY
Payer: MEDICARE

## 2017-06-13 DIAGNOSIS — M51.26: ICD-10-CM

## 2017-06-13 DIAGNOSIS — M48.06: Primary | ICD-10-CM

## 2017-06-13 PROCEDURE — 72148 MRI LUMBAR SPINE W/O DYE: CPT

## 2017-06-13 NOTE — [facility]
MRI Lumbar Spine without contrast

 

History: Low back pain, radicular pain, left hip pain for about 6 months

 

Technique: Multiplanar, multi sequential noncontrast MR imaging was 

performed of the lumbar spine.

 

Contrast: None

 

Comparison: None

 

Findings: 

There is motion degradation. Lumbar vertebral body stature and AP 

alignment are adequate. Conus terminates at L1. There is moderate to 

severe degenerative disc disease at L4-5, L4-5 endplate edema probably 

reactive/degenerative in etiology. There is mild-to-moderate degenerative 

disc disease L3-4, minimally at more superior levels. There is also edema 

of the anterior superior corner of L4 probably reactive/degenerative in 

etiology. There is some heterogeneity of marrow signal with degenerative 

change of the anterior superior corners of L2-L4. Conus terminates at L1.

 

L1-L2:  This level was not included on the axial images. There is broad 

posterior bulge. Neural foramina are not significantly narrowed.

 

L2-L3:  There is a broad posterior bulge. There is mild buckling of the 

ligamentum flavum and prominence of posterior epidural fat and mild to 

moderate facet degenerative change. There is mild narrowing of the right 

neural foramen, left neural foramen adequate. There is mild-to-moderate 

narrowing of the lateral recesses bilaterally, mild narrowing of the 

central canal.

 

L3-L4:  There is minimal disc osteophyte complex and bulge. There is 

prominence of posterior epidural fat, mild buckling of the ligamentum 

flavum, and mild facet degenerative change. There is overall mild spinal 

stenosis. There is mild to moderate right and mild left neural foramina 

compromise.

 

L4-L5:  There is moderate facet degenerative change and mild buckling of 

the ligamentum flavum. There is disc osteophyte complex and superimposed 

broad bulge/protrusion, also an extrusion extending below the 

intervertebral disc space in the left lateral recess. There is contact of 

the descending L5 nerve roots greater on the left. Extrusion is estimated 

at 0.6 cm transverse by 0.9 cm cc by up to 0.5 cm AP. There is mild 

prominence of posterior epidural fat, buckling of the ligamentum flavum, 

and moderate facet hypertrophic change. There is moderate to severe 

narrowing of the far lateral recesses bilaterally. There is mild narrowing

of the central canal. There is severe narrowing of the right neural 

foramen mostly by protrusion which contacts the exiting right L4 nerve 

root in the neural foramen and proximal extraforaminal region, moderate 

narrowing of the left neural foramen.

 

L5-S1:  Spinal canal and neural foramina are adequate. There is prominence

of epidural fat in the lateral recesses bilaterally, preserved central 

subarachnoid space.

 

Impression: 

 

1.  There is bilateral, left greater than right L4-5 lateral recess 

stenosis with contact of the descending L5 nerve roots greater on the 

left, extrusion extending below the intervertebral disc space in the left 

lateral recess. There is mild-to-moderate narrowing of the lateral 

recesses bilaterally at L2-3, mild spinal stenosis L3-4.

2. There is severe right and moderate left L4-5 neural foramina 

compromise, contact of the exiting right L4 nerve root by protrusion in 

the right neural foramen and proximal extraforaminal region.

3. There is moderate to severe degenerative disc disease L4-5, associated 

endplate edema likely reactive/degenerative in etiology. There is a lesser

degree of degenerative disc disease at L3-4 and minimally at more superior

levels.

 

Electronically signed by: Roshan Irby MD (6/13/2017 3:31 PM)

## 2017-08-03 ENCOUNTER — HOSPITAL ENCOUNTER (EMERGENCY)
Dept: HOSPITAL 61 - ER | Age: 67
Discharge: HOME | End: 2017-08-03
Payer: MEDICARE

## 2017-08-03 VITALS — DIASTOLIC BLOOD PRESSURE: 84 MMHG | SYSTOLIC BLOOD PRESSURE: 157 MMHG

## 2017-08-03 VITALS — HEIGHT: 73 IN | WEIGHT: 182 LBS | BODY MASS INDEX: 24.12 KG/M2

## 2017-08-03 DIAGNOSIS — Z88.5: ICD-10-CM

## 2017-08-03 DIAGNOSIS — I10: Primary | ICD-10-CM

## 2017-08-03 DIAGNOSIS — E78.00: ICD-10-CM

## 2017-08-03 DIAGNOSIS — F41.9: ICD-10-CM

## 2017-08-03 DIAGNOSIS — G89.29: ICD-10-CM

## 2017-08-03 DIAGNOSIS — F43.10: ICD-10-CM

## 2017-08-03 DIAGNOSIS — F10.10: ICD-10-CM

## 2017-08-03 LAB
ANION GAP SERPL CALC-SCNC: 8 MMOL/L (ref 6–14)
BASOPHILS # BLD AUTO: 0.1 X10^3/UL (ref 0–0.2)
BASOPHILS NFR BLD: 1 % (ref 0–3)
BUN SERPL-MCNC: 14 MG/DL (ref 8–26)
CALCIUM SERPL-MCNC: 8.6 MG/DL (ref 8.5–10.1)
CHLORIDE SERPL-SCNC: 103 MMOL/L (ref 98–107)
CO2 SERPL-SCNC: 27 MMOL/L (ref 21–32)
CREAT SERPL-MCNC: 1.4 MG/DL (ref 0.7–1.3)
EOSINOPHIL NFR BLD: 3 % (ref 0–3)
ERYTHROCYTE [DISTWIDTH] IN BLOOD BY AUTOMATED COUNT: 15 % (ref 11.5–14.5)
GFR SERPLBLD BASED ON 1.73 SQ M-ARVRAT: 61.5 ML/MIN
GLUCOSE SERPL-MCNC: 92 MG/DL (ref 70–99)
HCT VFR BLD CALC: 43.1 % (ref 39–53)
HGB BLD-MCNC: 14.2 G/DL (ref 13–17.5)
LYMPHOCYTES # BLD: 2.5 X10^3/UL (ref 1–4.8)
LYMPHOCYTES NFR BLD AUTO: 33 % (ref 24–48)
MAGNESIUM SERPL-MCNC: 1.9 MG/DL (ref 1.8–2.4)
MCH RBC QN AUTO: 27 PG (ref 25–35)
MCHC RBC AUTO-ENTMCNC: 33 G/DL (ref 31–37)
MCV RBC AUTO: 82 FL (ref 79–100)
MONOCYTES NFR BLD: 10 % (ref 0–9)
NEUTROPHILS NFR BLD AUTO: 53 % (ref 31–73)
PLATELET # BLD AUTO: 287 X10^3/UL (ref 140–400)
POTASSIUM SERPL-SCNC: 3.8 MMOL/L (ref 3.5–5.1)
RBC # BLD AUTO: 5.28 X10^6/UL (ref 4.3–5.7)
SODIUM SERPL-SCNC: 138 MMOL/L (ref 136–145)
WBC # BLD AUTO: 7.7 X10^3/UL (ref 4–11)

## 2017-08-03 PROCEDURE — 80048 BASIC METABOLIC PNL TOTAL CA: CPT

## 2017-08-03 PROCEDURE — 93005 ELECTROCARDIOGRAM TRACING: CPT

## 2017-08-03 PROCEDURE — 83735 ASSAY OF MAGNESIUM: CPT

## 2017-08-03 PROCEDURE — 85027 COMPLETE CBC AUTOMATED: CPT

## 2017-08-03 PROCEDURE — 36415 COLL VENOUS BLD VENIPUNCTURE: CPT

## 2017-08-03 NOTE — EKG
[facility]

                    [address] [place] [address]

Test Date:    2017               Test Time:    13:09:15

Pat Name:     JOEL VAUGHAN              Department:   

Patient ID:   [id number]           Room:          

Gender:       M                        Technician:   

:          1950               Requested By: SHELBY RUIZ

Order Number: [id number]            Reading MD:   Efrain Cerda

                                 Measurements

Intervals                              Axis          

Rate:         80                       P:            37

LA:           200                      QRS:          -22

QRSD:         82                       T:            -2

QT:           382                                    

QTc:          444                                    

                           Interpretive Statements

SINUS RHYTHM WITH FIRST DEGREE AV BLOCK

LEFTWARD AXIS

QRS(T) CONTOUR ABNORMALITY

CANNOT RULE OUT ANTEROSEPTAL MYOCARDIAL DAMAGE

RI6.01          Unconfirmed report

Compared to ECG 2017 16:21:25

Left-axis deviation now present



Electronically Signed On 8-3-2017 16:03:52 CDT by Efrain Cerda

## 2017-08-03 NOTE — PHYS DOC
Past Medical History


Past Medical History:  Anxiety, High Cholesterol, Hypertension, Other


Additional Past Medical Histor:  PTSD, MOOD DISORDER, CHRONIC PAIN


Past Surgical History:  Other


Additional Past Surgical Histo:  cardiac cath with no stent placement


Alcohol Use:  Heavy


Drug Use:  None





Adult General


Chief Complaint


Chief Complaint:  DIZZY/LIGHT HEADED





HPI


HPI





Patient is a 66  year old male who presents with complaint of high blood 

pressure. Patient states that he started having lightheadedness approximately 1 

hour prior to arrival. The patient states that he took his blood pressure 

during this episode and was unable to get a reading on his home blood pressure 

monitor. Patient states that he has had similar episodes with high blood 

pressure which is why he came to the emergency department for evaluation. Eyes 

any associated chest pain, shortness of breath, or unilateral weakness. Patient 

states that he takes lisinopril 20 mg daily. The patient states that he 

restarted citalopram yesterday but has had no other medication changes. Patient 

denies any recent illnesses.





Review of Systems


Review of Systems





Constitutional: Lightheadedness, denies fever or chills []


Eyes: Denies change in visual acuity, redness, or eye pain []


HENT: Denies nasal congestion or sore throat []


Respiratory: Denies cough or shortness of breath []


Cardiovascular: Denies chest pain or edema []


GI: Denies abdominal pain, nausea, vomiting, bloody stools or diarrhea []


: Denies dysuria or hematuria []


Musculoskeletal: Denies back pain or joint pain []


Integument: Denies rash or skin lesions []


Neurologic: Denies headache, focal weakness or sensory changes []





Current Medications


Current Medications





Current Medications








 Medications


  (Trade)  Dose


 Ordered  Sig/Isaiah  Start Time


 Stop Time Status Last Admin


Dose Admin


 


 Lisinopril


  (Prinivil)  20 mg  1X  STAT  8/3/17 13:38


 8/3/17 13:42 DC 8/3/17 13:54


20 MG











Allergies


Allergies





Allergies








Coded Allergies Type Severity Reaction Last Updated Verified


 


  trazodone Allergy Intermediate  6/4/16 Yes











Physical Exam


Physical Exam





Constitutional: Well developed, well nourished, no acute distress, non-toxic 

appearance. []


HENT: Normocephalic, atraumatic, bilateral external ears normal, oropharynx 

moist, no oral exudates, nose normal. []


Eyes: PERRLA, EOMI, conjunctiva normal, no discharge. [] 


Neck: Normal range of motion, no tenderness, supple, no stridor. [] 


Cardiovascular:Heart rate regular rhythm, no murmur []


Lungs & Thorax:  Bilateral breath sounds clear to auscultation []


Abdomen: Bowel sounds normal, soft, no tenderness, no masses, no pulsatile 

masses. [] 


Skin: Warm, dry, no erythema, no rash. [] 


Back: No tenderness, no CVA tenderness. [] 


Extremities: No tenderness, no cyanosis, no clubbing, ROM intact, no edema. [] 


Neurologic: Alert and oriented X 3, normal motor function, normal sensory 

function, no focal deficits noted. []


Psychologic: Affect normal, judgement normal, mood normal. []





Current Patient Data


Vital Signs





 Vital Signs








  Date Time  Temp Pulse Resp B/P (MAP) Pulse Ox O2 Delivery O2 Flow Rate FiO2


 


8/3/17 13:54  65  182/96    


 


8/3/17 13:00 97.7  16  98 Room Air  





 97.7       








Lab Values





 Laboratory Tests








Test


  8/3/17


13:33


 


White Blood Count


  7.7 x10^3/uL


(4.0-11.0)


 


Red Blood Count


  5.28 x10^6/uL


(4.30-5.70)


 


Hemoglobin


  14.2 g/dL


(13.0-17.5)


 


Hematocrit


  43.1 %


(39.0-53.0)


 


Mean Corpuscular Volume


  82 fL ()


 


 


Mean Corpuscular Hemoglobin 27 pg (25-35)  


 


Mean Corpuscular Hemoglobin


Concent 33 g/dL


(31-37)


 


Red Cell Distribution Width


  15.0 %


(11.5-14.5)  H


 


Platelet Count


  287 x10^3/uL


(140-400)


 


Neutrophils (%) (Auto) 53 % (31-73)  


 


Lymphocytes (%) (Auto) 33 % (24-48)  


 


Monocytes (%) (Auto) 10 % (0-9)  H


 


Eosinophils (%) (Auto) 3 % (0-3)  


 


Basophils (%) (Auto) 1 % (0-3)  


 


Neutrophils # (Auto)


  4.0 x10^3uL


(1.8-7.7)


 


Lymphocytes # (Auto)


  2.5 x10^3/uL


(1.0-4.8)


 


Monocytes # (Auto)


  0.8 x10^3/uL


(0.0-1.1)


 


Eosinophils # (Auto)


  0.2 x10^3/uL


(0.0-0.7)


 


Basophils # (Auto)


  0.1 x10^3/uL


(0.0-0.2)


 


Sodium Level


  138 mmol/L


(136-145)


 


Potassium Level


  3.8 mmol/L


(3.5-5.1)


 


Chloride Level


  103 mmol/L


()


 


Carbon Dioxide Level


  27 mmol/L


(21-32)


 


Anion Gap 8 (6-14)  


 


Blood Urea Nitrogen


  14 mg/dL


(8-26)


 


Creatinine


  1.4 mg/dL


(0.7-1.3)  H


 


Estimated GFR


(Cockcroft-Gault) 61.5  


 


 


Glucose Level


  92 mg/dL


(70-99)


 


Calcium Level


  8.6 mg/dL


(8.5-10.1)


 


Magnesium Level


  1.9 mg/dL


(1.8-2.4)





 Laboratory Tests


8/3/17 13:33








 Laboratory Tests


8/3/17 13:33











EKG


EKG


Interpreted by me: Heart rate 80, sinus rhythm, normal intervals, nonspecific T-

wave inversion in lead 3, no acute ST elevations or depressions []





Radiology/Procedures


Radiology/Procedures


Not performed []





Course & Med Decision Making


Course & Med Decision Making


Pertinent Labs and Imaging studies reviewed. (See chart for details)





The patient was given 20 mg of oral lisinopril in the emergency department. The 

patient's blood pressure improved to 162/90. The patient denies any symptoms 

currently. Patient was advised to recheck blood pressure and to take 40 mg of 

lisinopril if his blood pressure reads greater than 165/95 and to follow-up in 

the next 2-3 days with his primary doctor for reevaluation. Advised return 

emergency department for any worsening symptoms. Patient voiced understanding 

and in agreement with treatment plan.





Dragon Disclaimer


Dragon Disclaimer


This electronic medical record was generated, in whole or in part, using a 

voice recognition dictation system.





Departure


Departure


Impression:  


 Primary Impression:  


 Accelerated hypertension


Disposition:  01 HOME, SELF-CARE


Condition:  IMPROVED


Referrals:  


NO PCP (PCP)


Patient Instructions:  Hypertension





Additional Instructions:  


Follow-up to primary doctor in the next 2-3 days for reevaluation. Increase 

your blood pressure dose to 40 mg daily if your blood pressure reads greater 

than 165/95. Return to the emergency department for any worsening symptoms.











SHELBY RUIZ MD Aug 3, 2017 13:40

## 2017-10-05 ENCOUNTER — HOSPITAL ENCOUNTER (EMERGENCY)
Dept: HOSPITAL 61 - ER | Age: 67
Discharge: HOME | End: 2017-10-05
Payer: MEDICARE

## 2017-10-05 VITALS — DIASTOLIC BLOOD PRESSURE: 87 MMHG | SYSTOLIC BLOOD PRESSURE: 156 MMHG

## 2017-10-05 VITALS — WEIGHT: 182 LBS | BODY MASS INDEX: 24.12 KG/M2 | HEIGHT: 73 IN

## 2017-10-05 DIAGNOSIS — F10.20: ICD-10-CM

## 2017-10-05 DIAGNOSIS — T83.9XXA: Primary | ICD-10-CM

## 2017-10-05 DIAGNOSIS — E78.00: ICD-10-CM

## 2017-10-05 DIAGNOSIS — F43.10: ICD-10-CM

## 2017-10-05 DIAGNOSIS — Z88.8: ICD-10-CM

## 2017-10-05 DIAGNOSIS — Z79.899: ICD-10-CM

## 2017-10-05 DIAGNOSIS — I10: ICD-10-CM

## 2017-10-05 DIAGNOSIS — G89.29: ICD-10-CM

## 2017-10-05 LAB
BACTERIA #/AREA URNS HPF: (no result) /HPF
BILIRUB UR QL STRIP: NEGATIVE
GLUCOSE UR STRIP-MCNC: NEGATIVE MG/DL
NITRITE UR QL STRIP: NEGATIVE
PH UR STRIP: 6.5 [PH]
PROT UR STRIP-MCNC: >=300 MG/DL
RBC #/AREA URNS HPF: (no result) /HPF (ref 0–2)
SP GR UR STRIP: 1.02
UROBILINOGEN UR-MCNC: 0.2 MG/DL
WBC #/AREA URNS HPF: (no result) /HPF (ref 0–4)

## 2017-10-05 PROCEDURE — 87086 URINE CULTURE/COLONY COUNT: CPT

## 2017-10-05 PROCEDURE — 81001 URINALYSIS AUTO W/SCOPE: CPT

## 2017-10-05 PROCEDURE — 99284 EMERGENCY DEPT VISIT MOD MDM: CPT

## 2017-10-05 NOTE — PHYS DOC
Past Medical History


Past Medical History:  Anxiety, High Cholesterol, Hypertension, Other


Additional Past Medical Histor:  PTSD, MOOD DISORDER, CHRONIC PAIN


Past Surgical History:  Other


Additional Past Surgical Histo:  cardiac cath with no stent placement


Alcohol Use:  Heavy


Drug Use:  None





Adult General


Chief Complaint


Chief Complaint:  URINE CATHETER PROBLEM





HPI


HPI





Patient is a 66  year old male since to the emergency department stating that 

he had prostate surgery in which she removed the prostate due to cancer. He 

states that he had a Lyle catheter placed during surgery. He states that his 

catheter has not been functioning properly. He states that there is always 

urine in the tubing. He feels that the solution always be clear of urine. He 

denies any fever, chills or any nausea or vomiting. Denies any abdominal pain 

or discomfort. He does state that he's been having normal bowel movements.





Review of Systems


Review of Systems





Constitutional: Denies fever or chills []


Eyes: Denies change in visual acuity, redness, or eye pain []


HENT: Denies nasal congestion or sore throat []


Respiratory: Denies cough or shortness of breath []


Cardiovascular: No additional information not addressed in HPI []


GI: Denies abdominal pain, nausea, vomiting, bloody stools or diarrhea []


: Denies dysuria or hematuria. C/o lyle catheter not working properly


Musculoskeletal: Denies back pain or joint pain []


Integument: Denies rash or skin lesions []


Neurologic: Denies headache, focal weakness or sensory changes []


Endocrine: Denies polyuria or polydipsia []





Allergies


Allergies





Allergies








Coded Allergies Type Severity Reaction Last Updated Verified


 


  trazodone Allergy Intermediate  6/4/16 Yes











Physical Exam


Physical Exam





Constitutional: Well developed, well nourished, no acute distress, non-toxic 

appearance. []


HENT: Normocephalic, atraumatic, bilateral external ears normal, oropharynx 

moist, no oral exudates, nose normal. []


Eyes: PERRLA, EOMI, conjunctiva normal, no discharge. [] 


Neck: Normal range of motion, no tenderness, supple, no stridor. [] 


Cardiovascular:Heart rate regular rhythm, no murmur []


Lungs & Thorax:  Bilateral breath sounds clear to auscultation []


Abdomen: Bowel sounds hypoactive, soft, no tenderness, no masses, no pulsatile 

masses. [] 


Skin: Warm, dry, no erythema, no rash. [] 


Back: No tenderness


Extremities: No tenderness, no cyanosis, no clubbing, ROM intact, no edema. [] 


Neurologic: Alert and oriented X 3, normal motor function, normal sensory 

function, no focal deficits noted. []


Psychologic: Affect normal, judgement normal, mood normal. []


Lyle catheter noted to have scant bloody urine. Patient with urine noted in 

the catheter and in the catheter bag.





Current Patient Data


Vital Signs





 Vital Signs








  Date Time  Temp Pulse Resp B/P (MAP) Pulse Ox O2 Delivery O2 Flow Rate FiO2


 


10/5/17 07:53 97.9 79 20  95 Room Air  





 97.9       








Lab Values





 Laboratory Tests








Test


  10/5/17


07:50


 


Urine Collection Type Unknown  


 


Urine Color Red  


 


Urine Clarity Cloudy  


 


Urine pH 6.5  


 


Urine Specific Gravity 1.025  


 


Urine Protein


  >=300 mg/dL


(NEG-TRACE)


 


Urine Glucose (UA)


  Negative mg/dL


(NEG)


 


Urine Ketones (Stick)


  Trace mg/dL


(NEG)


 


Urine Blood Large (NEG)  


 


Urine Nitrite


  Negative (NEG)


 


 


Urine Bilirubin


  Negative (NEG)


 


 


Urine Urobilinogen Dipstick


  0.2 mg/dL (0.2


mg/dL)


 


Urine Leukocyte Esterase


  Moderate (NEG)


 


 


Urine RBC


  Tntc /HPF


(0-2)


 


Urine WBC


  1-4 /HPF (0-4)


 


 


Urine Bacteria


  Few /HPF


(0-FEW)











EKG


EKG


[]





Radiology/Procedures


Radiology/Procedures


[]





Course & Med Decision Making


Course & Med Decision Making


Pertinent Labs and Imaging studies reviewed. (See chart for details)


Urine was positive for leukocytes, patient states he has already been placed on 

Keflex Patient was provided with lyle catheter information and care. He was 

instructed that if he does not see urine the in the tubing or the bag this 

should raise concern. Abdominal pain may be associated with this as well. 

Patient was instructed to followup with surgeon in the next 3-5 days. 

Recommended plenty of fluids such as water and cranberry juice. Avoid cranberry 

juice cocktail, carbonated beverages, alcohol, citrus fruits and caffeine. 

Signs and symptoms to return to the emergency department has been provided. All 

questions and concerns have been answered at patients beside.


[]





Dragon Disclaimer


Dragon Disclaimer


This electronic medical record was generated, in whole or in part, using a 

voice recognition dictation system.





Departure


Departure


Impression:  


 Primary Impression:  


 Lyle catheter problem


Disposition:  01 HOME, SELF-CARE


Condition:  STABLE


Referrals:  


NO PCP (PCP)


Patient Instructions:  Lyle Catheter Care, Adult





Additional Instructions:  


Activity as tolerated


Continue with your Keflex. the urine will be cultured out, If the antibiotic 

needs to be changed you will be notified.


Drink plenty of fluids such as: water and cranberry juice. 


Avoid cranberry juice cocktail, carbonated beverages, citrus fruits, caffeine 

and alcohol. 


Continue to keep the catheter to dependent drainage


Followup with your surgeon in the next 3-5 days


Return to emergency department as needed for signs and symptoms that become 

worse.





Problem Qualifiers








 Primary Impression:  


 Lyle catheter problem


 Encounter type:  initial encounter  Qualified Codes:  T83.9XXA - Unspecified 

complication of genitourinary prosthetic device, implant and graft, initial 

encounter








MARIA LUZ KULKARNI Oct 5, 2017 08:00

## 2020-11-22 ENCOUNTER — HOSPITAL ENCOUNTER (EMERGENCY)
Dept: HOSPITAL 61 - ER | Age: 70
Discharge: HOME | End: 2020-11-22
Payer: MEDICARE

## 2020-11-22 VITALS — BODY MASS INDEX: 23.67 KG/M2 | WEIGHT: 178.57 LBS | HEIGHT: 73 IN

## 2020-11-22 VITALS — DIASTOLIC BLOOD PRESSURE: 98 MMHG | SYSTOLIC BLOOD PRESSURE: 177 MMHG

## 2020-11-22 DIAGNOSIS — G89.29: ICD-10-CM

## 2020-11-22 DIAGNOSIS — F17.200: ICD-10-CM

## 2020-11-22 DIAGNOSIS — F41.9: ICD-10-CM

## 2020-11-22 DIAGNOSIS — Z98.890: ICD-10-CM

## 2020-11-22 DIAGNOSIS — F10.10: ICD-10-CM

## 2020-11-22 DIAGNOSIS — Z88.8: ICD-10-CM

## 2020-11-22 DIAGNOSIS — E78.00: ICD-10-CM

## 2020-11-22 DIAGNOSIS — I10: Primary | ICD-10-CM

## 2020-11-22 PROCEDURE — 99283 EMERGENCY DEPT VISIT LOW MDM: CPT

## 2020-11-22 NOTE — PHYS DOC
Past Medical History


Past Medical History:  Anxiety, High Cholesterol, Hypertension, Other


Additional Past Medical Histor:  PTSD, MOOD DISORDER, CHRONIC PAIN


Past Surgical History:  Other


Additional Past Surgical Histo:  cardiac cath with no stent placement


Smoking Status:  Current Every Day Smoker


Alcohol Use:  Heavy


Drug Use:  None





General Adult


EDM:


Chief Complaint:  HYPERTENSION





HPI:


HPI:





Patient is a 70  year old male arrives as a chief complaint of high blood 

pressure.  Over the several days patient's notices blood pressure elevating when

he stands up.  Symptoms are better when he sits down.  Patient denies any chest 

pain shortness of breath, nausea vomiting headache or any other complaints other

than noticing his blood pressure was elevated when he stands up.  Patient takes 

one half of a 10 mg amlodipine in the morning and 20 mg lisinopril twice a day.





Review of Systems:


Review of Systems:


Constitutional:   Denies fever or chills. []


Eyes:   Denies change in visual acuity. []


HENT:   Denies nasal congestion or sore throat. [] 


Respiratory:   Denies cough or shortness of breath. [] 


Cardiovascular:   Denies chest pain or edema. [] 


GI:   Denies abdominal pain, nausea, vomiting, bloody stools or diarrhea. [] 


:  Denies dysuria. [] 


Musculoskeletal:   Denies back pain or joint pain. [] 


Integument:   Denies rash. [] 


Neurologic:   Denies headache, focal weakness or sensory changes. [] 


Endocrine:   Denies polyuria or polydipsia. [] 


Lymphatic:  Denies swollen glands. [] 


Psychiatric:  Denies depression or anxiety. []





Heart Score:


Risk Factors:


Risk Factors:  DM, Current or recent (<one month) smoker, HTN, HLP, family 

history of CAD, obesity.


Risk Scores:


Score 0 - 3:  2.5% MACE over next 6 weeks - Discharge Home


Score 4 - 6:  20.3% MACE over next 6 weeks - Admit for Clinical Observation


Score 7 - 10:  72.7% MACE over next 6 weeks - Early Invasive Strategies





Allergies:


Allergies:





Allergies








Coded Allergies Type Severity Reaction Last Updated Verified


 


  trazodone Allergy Intermediate  6/4/16 Yes











Physical Exam:


PE:





Constitutional: Well developed, well nourished, no acute distress, non-toxic 

appearance. []


HENT: Normocephalic, atraumatic, bilateral external ears normal, no trismus nose

 normal. []


Eyes: PERRLA, EOMI, conjunctiva normal, no discharge. [] 


Neck: Normal range of motion, no tenderness, supple, no stridor. [], No 

meningeal signs


Cardiovascular:Heart rate regular rhythm, peripheral pulses are intact cap 

refill is brisk


Lungs & Thorax:  Bilateral breath sounds clear, no respiratory distress


Abdomen soft, no tenderness, no masses, no pulsatile masses. [] 


Skin: Warm, dry, no erythema, no rash. [] 


Back: No tenderness, no CVA tenderness. [] 


Extremities: No tenderness, no cyanosis, no clubbing, ROM intact, no edema. [] 


Neurologic: Alert and oriented X 3, normal motor function, normal sensory 

function, no focal deficits noted. []


Psychologic: Affect normal, judgement normal, mood normal. []





Current Patient Data:


Vital Signs:





                                   Vital Signs








  Date Time  Temp Pulse Resp B/P (MAP) Pulse Ox O2 Delivery O2 Flow Rate FiO2


 


11/22/20 10:33 97.9 75 18 177/105 (129) 100 Room Air  





 97.9       











EKG:


EKG:


[]





Radiology/Procedures:


Radiology/Procedures:


[]





Course & Med Decision Making:


Course & Med Decision Making


Pertinent Labs and Imaging studies reviewed. (See chart for details)





[] 70-year-old male presents with a essentially asymptomatic high blood 

pressure.  Discussed with patient increasing his amlodipine from 5 to 10 mg in 

the morning.





Dragon Disclaimer:


Dragon Disclaimer:


This electronic medical record was generated, in whole or in part, using a voice

 recognition dictation system.





Departure


Departure


Impression:  


   Primary Impression:  


   HTN (hypertension)


Disposition:  01 DC HOME SELF CARE/HOMELESS


Condition:  STABLE


Referrals:  


OTTO VELEZ M.D. (PCP)


2-3 DAYS


Patient Instructions:  Hypertension





Additional Instructions:  


EMERGENCY DEPARTMENT GENERAL DISCHARGE INSTRUCTIONS





THANK YOU for coming to [facility] Emergency Department (ED) 

today and 


trusting us with your care.  We trust that you had a positive experience in our 

Emergency 


Department. If you wish to speak to the department Management you can contact 

the department 


Director at [phone].








YOUR FOLLOW UP INSTRUCTIONS ARE AS FOLLOWS: 





Do you have a private doctor? If you do not have a private doctor, please ask 

for a resource 


list of physicians or clinics that may be able to assist you with follow up 

care.  





The Emergency Physician has interpreted your x-rays. The X-ray specialist will 

also review 


them. If there is a change in the findings you will be notified in 48 hours when

 at all 


possible. 





A lab test or lab culture may have been done, your results will be reviewed and 

you will be 


notified if you need a change in treatment. 








ADDITIONAL INSTRUCTIONS AND INFORMATION 





Your care today has been supervised by a physician who is specially trained in 

emergency 


care. Many problems require more than one evaluation for a complete diagnosis 

and treatment. 


We recommend that you schedule your follow up appointment as recommended to en

sure complete 


treatment of your illness or injury.  If you are unable to obtain follow up care

 and 


continue to have a problem, or if your condition worsens we recommend that you 

return to the 


ED. 





We are not able to safely determine your condition over the phone nor are we 

able to give 


sound medical advice over the phone. For these safety reasons, if you call for 

medical 


advice we will ask you to come to the ED for further evaluation





If you have any questions regarding these discharge instructions please call the

 ED at [phone].





SAFETY INFORMATION





In the interest of safety, wellness, and injury prevention; we encourage you to 

wear your 


seatbelt, if you smoke; quit smoking, and we encourage your family to use 

protective helmet 


for bicycling and other sporting events that present an increased risk for head 

injury. 





IF YOUR SYMPTOMS WORSEN OR NEW SYMPTOMS DEVELOP, OR YOU HAVE CONCERNS ABOUT YOUR

 CONDITION; 


OR IF YOUR CONDITION WORSENS WHILE YOU ARE WAITING FOR YOUR FOLLOW UP 

APPOINTMENT;  EITHER  


CONTACT YOUR PRIMARY CARE DOCTOR, THE PHYSICIAN WHOSE NAME AND NUMBER YOU WERE 

GIVEN,  OR 


RETURN TO THE  ED IMMEDIATELY.


Scripts


Amlodipine Besylate (AMLODIPINE BESYLATE) 10 Mg Tablet


10 MG PO DAILY, #30 TAB


   Prov: JOYCE MEDINA MD         11/22/20











JOYCE MEDINA MD              Nov 22, 2020 11:16

## 2021-06-16 ENCOUNTER — HOSPITAL ENCOUNTER (EMERGENCY)
Dept: HOSPITAL 61 - ER | Age: 71
Discharge: HOME | End: 2021-06-16
Payer: MEDICARE

## 2021-06-16 VITALS — SYSTOLIC BLOOD PRESSURE: 137 MMHG | DIASTOLIC BLOOD PRESSURE: 79 MMHG

## 2021-06-16 VITALS — HEIGHT: 73 IN | WEIGHT: 167.55 LBS | BODY MASS INDEX: 22.21 KG/M2

## 2021-06-16 DIAGNOSIS — I10: ICD-10-CM

## 2021-06-16 DIAGNOSIS — E78.00: ICD-10-CM

## 2021-06-16 DIAGNOSIS — G89.29: ICD-10-CM

## 2021-06-16 DIAGNOSIS — F17.200: ICD-10-CM

## 2021-06-16 DIAGNOSIS — Z88.8: ICD-10-CM

## 2021-06-16 DIAGNOSIS — K52.9: Primary | ICD-10-CM

## 2021-06-16 LAB
ALBUMIN SERPL-MCNC: 4 G/DL (ref 3.4–5)
ALBUMIN/GLOB SERPL: 1 {RATIO} (ref 1–1.7)
ALP SERPL-CCNC: 98 U/L (ref 46–116)
ALT SERPL-CCNC: 53 U/L (ref 16–63)
ANION GAP SERPL CALC-SCNC: 13 MMOL/L (ref 6–14)
APTT PPP: YELLOW S
AST SERPL-CCNC: 52 U/L (ref 15–37)
BACTERIA #/AREA URNS HPF: 0 /HPF
BASOPHILS # BLD AUTO: 0 X10^3/UL (ref 0–0.2)
BASOPHILS NFR BLD: 1 % (ref 0–3)
BILIRUB SERPL-MCNC: 0.6 MG/DL (ref 0.2–1)
BILIRUB UR QL STRIP: NEGATIVE
BUN SERPL-MCNC: 15 MG/DL (ref 8–26)
BUN/CREAT SERPL: 11 (ref 6–20)
CALCIUM SERPL-MCNC: 8.9 MG/DL (ref 8.5–10.1)
CHLORIDE SERPL-SCNC: 102 MMOL/L (ref 98–107)
CO2 SERPL-SCNC: 27 MMOL/L (ref 21–32)
CREAT SERPL-MCNC: 1.4 MG/DL (ref 0.7–1.3)
EOSINOPHIL NFR BLD: 0.1 X10^3/UL (ref 0–0.7)
EOSINOPHIL NFR BLD: 1 % (ref 0–3)
ERYTHROCYTE [DISTWIDTH] IN BLOOD BY AUTOMATED COUNT: 15.1 % (ref 11.5–14.5)
FIBRINOGEN PPP-MCNC: CLEAR MG/DL
GFR SERPLBLD BASED ON 1.73 SQ M-ARVRAT: 60.8 ML/MIN
GLUCOSE SERPL-MCNC: 139 MG/DL (ref 70–99)
HCT VFR BLD CALC: 40 % (ref 39–53)
HGB BLD-MCNC: 13.4 G/DL (ref 13–17.5)
HYALINE CASTS #/AREA URNS LPF: (no result) /HPF
LIPASE: 175 U/L (ref 73–393)
LYMPHOCYTES # BLD: 1.3 X10^3/UL (ref 1–4.8)
LYMPHOCYTES NFR BLD AUTO: 21 % (ref 24–48)
MCH RBC QN AUTO: 27 PG (ref 25–35)
MCHC RBC AUTO-ENTMCNC: 34 G/DL (ref 31–37)
MCV RBC AUTO: 80 FL (ref 79–100)
MONO #: 0.5 X10^3/UL (ref 0–1.1)
MONOCYTES NFR BLD: 8 % (ref 0–9)
NEUT #: 4.1 X10^3/UL (ref 1.8–7.7)
NEUTROPHILS NFR BLD AUTO: 69 % (ref 31–73)
NITRITE UR QL STRIP: NEGATIVE
PH UR STRIP: 5.5 [PH]
PLATELET # BLD AUTO: 347 X10^3/UL (ref 140–400)
POTASSIUM SERPL-SCNC: 3.4 MMOL/L (ref 3.5–5.1)
PROT SERPL-MCNC: 8 G/DL (ref 6.4–8.2)
PROT UR STRIP-MCNC: 30 MG/DL
RBC # BLD AUTO: 5.01 X10^6/UL (ref 4.3–5.7)
RBC #/AREA URNS HPF: 0 /HPF (ref 0–2)
SODIUM SERPL-SCNC: 142 MMOL/L (ref 136–145)
UROBILINOGEN UR-MCNC: 1 MG/DL
WBC # BLD AUTO: 6 X10^3/UL (ref 4–11)
WBC #/AREA URNS HPF: (no result) /HPF (ref 0–4)

## 2021-06-16 PROCEDURE — 99284 EMERGENCY DEPT VISIT MOD MDM: CPT

## 2021-06-16 PROCEDURE — 83735 ASSAY OF MAGNESIUM: CPT

## 2021-06-16 PROCEDURE — 80053 COMPREHEN METABOLIC PANEL: CPT

## 2021-06-16 PROCEDURE — 74177 CT ABD & PELVIS W/CONTRAST: CPT

## 2021-06-16 PROCEDURE — 81001 URINALYSIS AUTO W/SCOPE: CPT

## 2021-06-16 PROCEDURE — 84484 ASSAY OF TROPONIN QUANT: CPT

## 2021-06-16 PROCEDURE — 96374 THER/PROPH/DIAG INJ IV PUSH: CPT

## 2021-06-16 PROCEDURE — 85025 COMPLETE CBC W/AUTO DIFF WBC: CPT

## 2021-06-16 PROCEDURE — 36415 COLL VENOUS BLD VENIPUNCTURE: CPT

## 2021-06-16 PROCEDURE — 83690 ASSAY OF LIPASE: CPT

## 2021-06-16 NOTE — RAD
CT STUDY OF THE ABDOMEN AND PELVIS WITH CONTRAST



Clinical indications: Abdominal pain. Epigastric pain for 2 weeks. History of prostate cancer.



TECHNIQUE: After IV infusion of 75 cc of Omnipaque 300, helical CT scanning of the abdomen and pelvis
 was performed. GI contrast was not administered. This may decrease the sensitivity to detect GI trac
t pathology.



PQRS COMPLIANCE STATEMENT

One or more of the following individualized dose reduction techniques were utilized for this study:

1.  Automated exposure control

2.  Adjustment of the mA and/or kV according to patient size

3.  Use of iterative reconstruction technique



COMPARISON: No previous abdomen/pelvis CT available.



FINDINGS: The liver and spleen and pancreas are normal. Gallbladder is normal and no extrahepatic perlita
iary ductal dilatation is seen. No adrenal mass is seen. There is a hypodense nodule within the upper
 pole of the right kidney which demonstrates Hounsfield unit measurements are 11 - 15 consistent with
 a cyst. There are small subcentimeter hypodense nodules within the posterior mid aspect of the right
 kidney and the lateral mid aspect of the left kidney which are too small to accurately characterize.
 No hydronephrosis or hydroureter or urinary tract stone is evident. Urinary bladder is not abnormall
y distended. No focal aneurysmal dilatation of the abdominal aorta is seen. No enlarged abdominal lym
phadenopathy is seen. There is a cystic lesion within the anterior left side of the lower pelvis adrien
cent to the external iliac vessels which measures anywhere from 1 Hounsfield unit to 19 Hounsfield un
its. There is a thin peripheral rim. This lesion measures 41 mm in greatest dimension. There is a sim
ilar lesion seen on the right side which is smaller measuring 36 mm. This measures anywhere from 0  t
o11 Hounsfield units. Thin peripheral rim is seen here as well. No other pelvic lymphadenopathy is se
en otherwise. The prostate gland appears to be surgically absent. No anterior abdominal wall hernia o
r inguinal canal hernia is seen. No obstructive bowel pattern is seen. There is diffuse wall thickeni
ng of the colon starting at the. Transverse colon down into the rectosigmoid region. The appendix and
 terminal ileum are unremarkable. No free air or free fluid or mesenteric edema is seen. Calcified gr
anulomas of both lung bases are seen. No lytic process is seen.



IMPRESSION: Wall thickening of the colon from the mid transverse colon down into the rectosigmoid reg
ion. This may be due to incomplete distention but may be seen with colitis.



Indeterminate subcentimeter bilateral renal nodules. These may be difficult to see sonographically du
e to their small size. Therefore, follow-up abdomen CT with IV contrast in one year is recommended. T
here is a larger cyst involving the upper pole of the right kidney.



Bilateral anterior pelvic cystic lesions adjacent to the external iliac vessels. This could represent
 postoperative fluid collections after prostatectomy since the prostate gland is surgically absent. A
nother possibility is cystic necrosis of bilateral pelvic lymph nodes if there is a history of previo
us treatment of pelvic lymphadenopathy. No anterior abdominal wall or inguinal wall hernia is seen.



Electronically signed by: Greg Tay MD (6/16/2021 11:17 AM) [id number]

## 2021-06-16 NOTE — PHYS DOC
Past Medical History


Past Medical History:  Anxiety, High Cholesterol, Hypertension, Other


Additional Past Medical Histor:  PTSD, MOOD DISORDER, CHRONIC PAIN


Past Surgical History:  Other


Additional Past Surgical Histo:  cardiac cath with no stent placement


Smoking Status:  Current Every Day Smoker


Alcohol Use:  Heavy


Drug Use:  None





General Adult


EDM:


Chief Complaint:  ABDOMINAL PAIN





HPI:


HPI:





Patient is a 70  year old [f__sex] who presents with []





Review of Systems:


Review of Systems:


Constitutional:   Denies fever or chills. []


Eyes:   Denies change in visual acuity. []


HENT:   Denies nasal congestion or sore throat. [] 


Respiratory:   Denies cough or shortness of breath. [] 


Cardiovascular:   Denies chest pain or edema. [] 


GI:   Denies abdominal pain, nausea, vomiting, bloody stools or diarrhea. [] 


:  Denies dysuria. [] 


Musculoskeletal:   Denies back pain or joint pain. [] 


Integument:   Denies rash. [] 


Neurologic:   Denies headache, focal weakness or sensory changes. [] 


Endocrine:   Denies polyuria or polydipsia. [] 


Lymphatic:  Denies swollen glands. [] 


Psychiatric:  Denies depression or anxiety. []





Heart Score:


C/O Chest Pain:  N/A


Risk Factors:


Risk Factors:  DM, Current or recent (<one month) smoker, HTN, HLP, family 

history of CAD, obesity.


Risk Scores:


Score 0 - 3:  2.5% MACE over next 6 weeks - Discharge Home


Score 4 - 6:  20.3% MACE over next 6 weeks - Admit for Clinical Observation


Score 7 - 10:  72.7% MACE over next 6 weeks - Early Invasive Strategies





Allergies:


Allergies:





Allergies








Coded Allergies Type Severity Reaction Last Updated Verified


 


  trazodone Allergy Intermediate  6/4/16 Yes











Physical Exam:


PE:





Constitutional: Well developed, well nourished, no acute distress, non-toxic 

appearance. []


HENT: Normocephalic, atraumatic, bilateral external ears normal, oropharynx 

moist, no oral exudates, nose normal. []


Eyes: PERRLA, EOMI, conjunctiva normal, no discharge. [] 


Neck: Normal range of motion, no tenderness, supple, no stridor. [] 


Cardiovascular:Heart rate regular rhythm, no murmur []


Lungs & Thorax:  Bilateral breath sounds clear to auscultation []


Abdomen: Bowel sounds normal, soft, no tenderness, no masses, no pulsatile 

masses. [] 


Skin: Warm, dry, no erythema, no rash. [] 


Back: No tenderness, no CVA tenderness. [] 


Extremities: No tenderness, no cyanosis, no clubbing, ROM intact, no edema. [] 


Neurologic: Alert and oriented X 3, normal motor function, normal sensory 

function, no focal deficits noted. []


Psychologic: Affect normal, judgement normal, mood normal. []





EKG:


EKG:


Normal Sinus Rhythm


Rate 76


No ST elevation


No ST depression


No acute MI []


Performed by [id number]





Radiology/Procedures:


Radiology/Procedures:


[]


Impression:


CT STUDY OF THE ABDOMEN AND PELVIS WITH CONTRAST





Clinical indications: Abdominal pain. Epigastric pain for 2 weeks. History of 

prostate cancer.





TECHNIQUE: After IV infusion of 75 cc of Omnipaque 300, helical CT scanning of 

the abdomen and pelvis was performed. GI contrast was not administered. This may

 decrease the sensitivity to detect GI tract pathology.





PQRS COMPLIANCE STATEMENT


One or more of the following individualized dose reduction techniques were 

utilized for this study:


1.  Automated exposure control


2.  Adjustment of the mA and/or kV according to patient size


3.  Use of iterative reconstruction technique





COMPARISON: No previous abdomen/pelvis CT available.





FINDINGS: The liver and spleen and pancreas are normal. Gallbladder is normal 

and no extrahepatic biliary ductal dilatation is seen. No adrenal mass is seen. 

There is a hypodense nodule within the upper pole of the right kidney which 

demonstrates Hounsfield unit measurements are 11 - 15 consistent with a cyst. 

There are small subcentimeter hypodense nodules within the posterior mid aspect 

of the right kidney and the lateral mid aspect of the left kidney which are too 

small to accurately characterize. No hydronephrosis or hydroureter or urinary 

tract stone is evident. Urinary bladder is not abnormally distended. No focal 

aneurysmal dilatation of the abdominal aorta is seen. No enlarged abdominal 

lymphadenopathy is seen. There is a cystic lesion within the anterior left side 

of the lower pelvis adjacent to the external iliac vessels which measures 

anywhere from 1 Hounsfield unit to 19 Hounsfield units. There is a thin 

peripheral rim. This lesion measures 41 mm in greatest dimension. There is a 

similar lesion seen on the right side which is smaller measuring 36 mm. This 

measures anywhere from 0  to11 Hounsfield units. Thin peripheral rim is seen 

here as well. No other pelvic lymphadenopathy is seen otherwise. The prostate 

gland appears to be surgically absent. No anterior abdominal wall hernia or 

inguinal canal hernia is seen. No obstructive bowel pattern is seen. There is 

diffuse wall thickening of the colon starting at the. Transverse colon down into

 the rectosigmoid region. The appendix and terminal ileum are unremarkable. No 

free air or free fluid or mesenteric edema is seen. Calcified granulomas of both

 lung bases are seen. No lytic process is seen.





IMPRESSION: Wall thickening of the colon from the mid transverse colon down into

 the rectosigmoid region. This may be due to incomplete distention but may be 

seen with colitis.





Indeterminate subcentimeter bilateral renal nodules. These may be difficult to 

see sonographically due to their small size. Therefore, follow-up abdomen CT 

with IV contrast in one year is recommended. There is a larger cyst involving t

he upper pole of the right kidney.





Bilateral anterior pelvic cystic lesions adjacent to the external iliac vessels.

 This could represent postoperative fluid collections after prostatectomy since 

the prostate gland is surgically absent. Another possibility is cystic necrosis 

of bilateral pelvic lymph nodes if there is a history of previous treatment of 

pelvic lymphadenopathy. No anterior abdominal wall or inguinal wall hernia is 

seen.





Course & Med Decision Making:


Course & Med Decision Making


Pertinent Labs and Imaging studies reviewed. (See chart for details)





[]





Dragon Disclaimer:


Dragon Disclaimer:


This electronic medical record was generated, in whole or in part, using a voice

 recognition dictation system.





Departure


Departure


Impression:  


   Primary Impression:  


   Abdominal pain


   Additional Impression:  


   Colitis


Disposition:  01 HOME / SELF CARE / HOMELESS


Condition:  STABLE


Referrals:  


OTTO VELEZ M.D. (PCP)


Patient Instructions:  Abdominal Migraine, Colitis


Scripts


Ondansetron Hcl (ZOFRAN) 4 Mg Tablet


1 TAB PO Q6HRS, #20 TAB


   Prov: MEREDITH BRUCE DO         6/16/21 


Metronidazole (FLAGYL) 500 Mg Tablet


1 TAB PO BID, #14 TAB


   Prov: MEREDITH BRUCE DO         6/16/21 


Ciprofloxacin Hcl (CIPRO) 500 Mg Tablet


1 TAB PO BID for 7 Days, #14 TAB 0 Refills


   Prov: MEREDITH BRUCE DO         6/16/21











MEREDITH BRUCE DO            Jun 16, 2021 09:37

## 2021-10-06 ENCOUNTER — HOSPITAL ENCOUNTER (EMERGENCY)
Dept: HOSPITAL 61 - ER | Age: 71
Discharge: HOME | End: 2021-10-06
Payer: MEDICARE

## 2021-10-06 VITALS — SYSTOLIC BLOOD PRESSURE: 126 MMHG | DIASTOLIC BLOOD PRESSURE: 74 MMHG

## 2021-10-06 VITALS — HEIGHT: 73 IN | BODY MASS INDEX: 20.45 KG/M2 | WEIGHT: 154.32 LBS
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
## 2021-10-06 DIAGNOSIS — Z88.8: ICD-10-CM

## 2021-10-06 DIAGNOSIS — S01.01XA: Primary | ICD-10-CM

## 2021-10-06 DIAGNOSIS — Z88.6: ICD-10-CM
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
## 2021-10-06 DIAGNOSIS — E78.00: ICD-10-CM

## 2021-10-06 DIAGNOSIS — I10: ICD-10-CM

## 2021-10-06 DIAGNOSIS — F43.10: ICD-10-CM
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
## 2021-10-06 DIAGNOSIS — G89.29: ICD-10-CM

## 2021-10-06 PROCEDURE — 72125 CT NECK SPINE W/O DYE: CPT

## 2021-10-06 PROCEDURE — 90471 IMMUNIZATION ADMIN: CPT

## 2021-10-06 PROCEDURE — 90715 TDAP VACCINE 7 YRS/> IM: CPT

## 2021-10-06 PROCEDURE — 12002 RPR S/N/AX/GEN/TRNK2.6-7.5CM: CPT

## 2021-10-06 PROCEDURE — 70450 CT HEAD/BRAIN W/O DYE: CPT

## 2021-10-06 NOTE — RAD
CT HEAD AND C-SPINE WO dated 10/6/2021 2:27 AM.



Comparison: 1/29/2010



Clinical Indication: Reason: blunt head injury / Spl. Instructions:  / History: : HEAD INJURY. HEAD A
ND NECK PAIN.



Technical factors: Contiguous 5 mm axial images of the head were obtained from the skullbase to the v
ertex. No contrast was administered. In addition, 3 mm axial images of the cervical spine were acquir
ed with thin cut coronal and sagittal reconstructions.

One or more of the following individualized dose reduction techniques were utilized for this examinat
ion:  

1. Automated exposure control  

2. Adjustment of the mA and/or kV according to patient size  

3. Use of iterative reconstruction technique





Findings head:



Ventricles and sulci are within normal limits for age. No midline shift or mass effect. Brain parench
yma is of normal attenuation. No hemorrhage or extra axial collection. Posterior fossa and brainstem 
unremarkable.



Mild mucosal thickening of the ethmoid air cells. The visualized paranasal sinuses and mastoid air ce
lls are otherwise clear. No acute calvarial abnormality.



IMPRESSION HEAD:

No evidence of acute intracranial abnormality.





Findings cervical spine:



Images were acquired from the skull base to T3. Slight anterolisthesis of C4 on C5 and C5 on C6 with 
slight retrolisthesis of C2 on C3 and C3 on C4. Vertebral body heights are maintained. No prevertebra
l soft tissue swelling. Posterior elements are intact. No evidence of fracture. 



Moderate endplate hypertrophic changes throughout. There is severe disc space narrowing at C6-C7 with
 mild disc space narrowing at C5-C6. Multilevel uncovertebral spurring with mild to moderate facet ar
thropathy. There is resultant moderate bilateral foraminal stenosis at C4-C5, C5-C6 and C6-C7. No zenaida
arent focal disc herniation. Bony canal is adequate.



Visualized soft tissue structures are unremarkable. Mild emphysema at the lung apices.



IMPRESSION CERVICAL SPINE:

1. No evidence of fracture or malalignment.

2. Moderate multilevel spondylosis.

3. Mild emphysema.



Electronically signed by: Bobby Kraft MD (10/6/2021 2:37 AM) [facility]

## 2021-10-06 NOTE — PHYS DOC
Past Medical History


Past Medical History:  Anxiety, Cancer, Depression, High Cholesterol, Hype

rtension, Other


Additional Past Medical Histor:  PTSD, MOOD DISORDER, CHRONIC PAIN, prostate 

cancer


Additional Past Surgical Histo:  cardiac cath with no stent placement, TURP


Smoking Status:  Never Smoker


Alcohol Use:  Occasionally


Drug Use:  None





General Adult


EDM:


Chief Complaint:  MECHANICAL FALL





HPI:


HPI:


70-year-old male presents to the emergency department after a fall at his home 

where he hit the back of his head on a chair.  He denies any loss of 

consciousness, blood thinner use.  States he had  some bleeding at the scene.  

States that he feels okay at the moment just a slight headache.  The patient 

denies nausea, vomiting, fever, chills, chest pain, shortness of breath, 

abdominal pain, urinary symptoms, cough, or any other complaints.





Review of Systems:


Review of Systems:


ROS is otherwise negative except what was mentioned in HPI





Heart Score:


C/O Chest Pain:  No





Current Medications:





Current Medications








 Medications


  (Trade)  Dose


 Ordered  Sig/Isaiah  Start Time


 Stop Time Status Last Admin


Dose Admin


 


 Tetracaine/


 Epinephrine/


 Lidocaine


  (Let


  (Lido-Epineph-Tetra)


 Gel)  3 ml  1X  ONCE  10/6/21 02:30


 10/6/21 02:31 DC 10/6/21 02:30


3 ML











Allergies:


Allergies:





Allergies








Coded Allergies Type Severity Reaction Last Updated Verified


 


  aspirin Allergy Severe lips/face swelling 6/16/21 Yes


 


  trazodone Adverse Reaction Intermediate "hot flashes" 6/16/21 Yes











Physical Exam:


PE:


Constitutional: No acute distress, non-toxic appearance.


HENT: 4 cm laceration to the left occipital area, linear, superficial, well 

aligned


Eyes: PERRLA, EOMI, conjunctiva normal, no discharge.


Neck: Normal range of motion, supple, no stridor.


Cardiovascular: Heart rate regular rhythm. 2+ radial pulses


Lungs & Thorax: No respiratory distress, symmetrical expansion.  Bilateral 

breath sounds clear to auscultation


Abdomen: Soft, no tenderness


Skin: Warm, dry.


Extremities: No tenderness, no cyanosis, ROM intact, no edema.


Neurologic: Alert and oriented X 3, normal motor function, normal sensory 

function, no focal deficits noted. Non ataxic gait. GCS 15.


Psychologic: Affect normal, judgment normal, mood normal.





Current Patient Data:


Vital Signs:





                                   Vital Signs








  Date Time  Temp Pulse Resp B/P (MAP) Pulse Ox O2 Delivery O2 Flow Rate FiO2


 


10/6/21 02:30  52 18 126/74 (91) 99   


 


10/6/21 02:17 97.7       





 97.7       











Radiology/Procedures:


Radiology/Procedures:


CT HEAD AND C-SPINE WO dated 10/6/2021 2:27 AM.





Comparison: 1/29/2010





Clinical Indication: Reason: blunt head injury / Spl. Instructions:  / History: 

: HEAD INJURY. HEAD AND NECK PAIN.





Technical factors: Contiguous 5 mm axial images of the head were obtained from 

the skullbase to the vertex. No contrast was administered. In addition, 3 mm 

axial images of the cervical spine were acquired with thin cut coronal and 

sagittal reconstructions.


One or more of the following individualized dose reduction techniques were 

utilized for this examination:  


1. Automated exposure control  


2. Adjustment of the mA and/or kV according to patient size  


3. Use of iterative reconstruction technique








Findings head:





Ventricles and sulci are within normal limits for age. No midline shift or mass 

effect. Brain parenchyma is of normal attenuation. No hemorrhage or extra axial 

collection. Posterior fossa and brainstem unremarkable.





Mild mucosal thickening of the ethmoid air cells. The visualized paranasal 

sinuses and mastoid air cells are otherwise clear. No acute calvarial 

abnormality.





IMPRESSION HEAD:


No evidence of acute intracranial abnormality.








Findings cervical spine:





Images were acquired from the skull base to T3. Slight anterolisthesis of C4 on 

C5 and C5 on C6 with slight retrolisthesis of C2 on C3 and C3 on C4. Vertebral 

body heights are maintained. No prevertebral soft tissue swelling. Posterior 

elements are intact. No evidence of fracture. 





Moderate endplate hypertrophic changes throughout. There is severe disc space 

narrowing at C6-C7 with mild disc space narrowing at C5-C6. Multilevel 

uncovertebral spurring with mild to moderate facet arthropathy. There is 

resultant moderate bilateral foraminal stenosis at C4-C5, C5-C6 and C6-C7. No 

apparent focal disc herniation. Bony canal is adequate.





Visualized soft tissue structures are unremarkable. Mild emphysema at the lung 

apices.





IMPRESSION CERVICAL SPINE:


1. No evidence of fracture or malalignment.


2. Moderate multilevel spondylosis.


3. Mild emphysema.





Electronically signed by: Bobby Kraft MD (10/6/2021 2:37 AM) 





Laceration Repair Procedure





Time: 0325


Confirmed: Patient, procedure, side, and site correct.  


Consent: Patient has given verbal consent.  


Description/ repair 


Laceration: Location: Left occipital scalp.  4 cm in length. Shape: Linear. 

Depth: Superficial. 


Details: clean, no foreign material. 


Neurovascular/ tendon exam: intact. 


Anesthesia: Let gel


Preparation: sterile field established. 


Irrigation: wound irrigated copiously with normal saline with pressure cap. 


Debridement: none. 


Skin closure: 4 staples


Complexity: Single layer. 


Post procedure exam: Circulation, motor, sensory examination intact, Bleeding 

controlled.  


Complications: None.  


Patient tolerated: Well.  


Performed by: Corby Montano DO.





Course & Med Decision Making:


Course & Med Decision Making


CT negative, patient GCS 15, laceration repaired at the bedside, patient 

counseled on laceration care and will follow up for staple removal in 7 days





Departure


Departure


Impression:  


   Primary Impression:  


   Scalp laceration


   Additional Impression:  


   Blunt head trauma


Disposition:  01 HOME / SELF CARE / HOMELESS


Referrals:  


UNKNOWN PCP NAME (PCP)


Patient Instructions:  Laceration Care, Adult, Easy-to-Read





Additional Instructions:  


You were seen in the emergency department for a laceration, which was repaired 

with staples as with all lacerations, there is a chance that the laceration will

leave a scar. You should wear sunscreen and/or vitamin E cream to help reduce 

scar formation over the wound. The laceration area may take weeks-months to heal

completely and may not return to its full tensile strength. You may apply 

topical bacitracin or Neosporin over the wound if this helps soothe the area. 


It is OK to shower with the wound after your remove the dressing. Wounds can be 

gently cleansed with soap and water in the shower, but you should avoid soaking 

the wound or swimming, generally until after staples are removed. 


Lacerations have a chance of infection, and you should return to the ER for a 

recheck if you have fever, warmth, redness, increased swelling, pus draining 

from the sutured wound, or any further concerns. 


Please go to your primary care physician, an urgent care, or return to the ED to

have your staples removed in 7 days.











CORBY MONTANO DO              Oct 6, 2021 02:52

## 2021-10-15 ENCOUNTER — HOSPITAL ENCOUNTER (EMERGENCY)
Dept: HOSPITAL 61 - ER | Age: 71
Discharge: HOME | End: 2021-10-15
Payer: MEDICARE

## 2021-10-15 VITALS — SYSTOLIC BLOOD PRESSURE: 147 MMHG | DIASTOLIC BLOOD PRESSURE: 85 MMHG

## 2021-10-15 VITALS — HEIGHT: 73 IN | BODY MASS INDEX: 23.9 KG/M2 | WEIGHT: 180.34 LBS

## 2021-10-15 DIAGNOSIS — G89.29: ICD-10-CM

## 2021-10-15 DIAGNOSIS — S01.01XD: Primary | ICD-10-CM
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
## 2021-10-15 DIAGNOSIS — I10: ICD-10-CM

## 2021-10-15 DIAGNOSIS — Z88.6: ICD-10-CM

## 2021-10-15 DIAGNOSIS — Z88.8: ICD-10-CM

## 2021-10-15 DIAGNOSIS — E78.00: ICD-10-CM

## 2021-10-15 PROCEDURE — 99281 EMR DPT VST MAYX REQ PHY/QHP: CPT

## 2021-10-15 NOTE — PHYS DOC
Past Medical History


Past Medical History:  Anxiety, Cancer, Depression, High Cholesterol, Hype

rtension, Other


Additional Past Medical Histor:  PTSD, MOOD DISORDER, CHRONIC PAIN, prostate 

cancer


 [facility]


Past Surgical History:  No Surgical History


Additional Past Surgical Histo:  cardiac cath with no stent placement, TURP


 [facility]


Smoking Status:  Never Smoker


Alcohol Use:  Occasionally


Drug Use:  None


 [facility]





General Adult


EDM:


Chief Complaint:  SUTURE/STAPLE REMOVAL





HPI:


HPI:





Patient is a 71  year old male who presents with 1 October 6 patient fell and 

got a laceration to the left side of the back of his head.  Staples were put in.

 Patient came in today to have staples removed.  He denies headache, dizziness, 

redness or increased pain to the area.


 [facility]





Review of Systems:


Review of Systems:


Constitutional:   Denies fever or chills. []


Eyes:   Denies change in visual acuity. []


HENT:   Denies nasal congestion or sore throat. [] 


Respiratory:   Denies cough or shortness of breath. [] 


Cardiovascular:   Denies chest pain or edema. [] 


GI:   Denies abdominal pain, nausea, vomiting, bloody stools or diarrhea. [] 


:  Denies dysuria. [] 


Musculoskeletal:   Denies back pain or joint pain. [] 


Integument:   Denies rash.  +Sutured laceration to the back of the head [] 


Neurologic:   Denies headache, focal weakness or sensory changes. [] 


Endocrine:   Denies polyuria or polydipsia. [] 


Lymphatic:  Denies swollen glands. [] 


Psychiatric:  Denies depression or anxiety. []


 ([facility],MARIA LUZ M APRN)





Heart Score:


C/O Chest Pain:  No


 [facility]





Allergies:


Allergies:





Allergies








Coded Allergies Type Severity Reaction Last Updated Verified


 


  aspirin Allergy Severe lips/face swelling 6/16/21 Yes


 


  trazodone Adverse Reaction Intermediate "hot flashes" 6/16/21 Yes








 [facility]





Physical Exam:


PE:





Constitutional: Well developed, well nourished, no acute distress, non-toxic 

appearance. []


HENT: Normocephalic, atraumatic, bilateral external ears normal, oropharynx 

moist, no oral exudates, nose normal. []


Eyes: PERRLA, EOMI, conjunctiva normal, no discharge. [] 


Neck: Normal range of motion, no tenderness, supple, no stridor. [] 


Cardiovascular:Heart rate regular rhythm, no murmur []


Lungs & Thorax:  Bilateral breath sounds clear to auscultation []


Abdomen: Bowel sounds normal, soft, no tenderness, no masses, no pulsatile 

masses. [] 


Skin: Warm, dry, no erythema, no rash.  Sutures to the left back of the head 

intact.  Laceration healed.  [] 


Back: No tenderness, no CVA tenderness. [] 


Extremities: No tenderness, no cyanosis, no clubbing, ROM intact, no edema. [] 


Neurologic: Alert and oriented X 3, normal motor function, normal sensory 

function, no focal deficits noted. []


Psychologic: Affect normal, judgement normal, mood normal. []


 (MARIA LUZ FORTUNE)





Current Patient Data:


Vital Signs:





                                   Vital Signs








  Date Time  Temp Pulse Resp B/P (MAP) Pulse Ox O2 Delivery O2 Flow Rate FiO2


 


10/15/21 17:35 97.6 88 22 147/85 (105) 98 Room Air  





 97.6       








 (MARIA LUZ FORTUNE)





EKG:


EKG:


[]


 (MARIA LUZ FORTUNE)





Radiology/Procedures:


Radiology/Procedures:


[]


 (MARIA LUZ FORTUNE)





Course & Med Decision Making:


Course & Med Decision Making


Pertinent Labs and Imaging studies reviewed. (See chart for details)





See HPI.  Staples removed by nurse.  Edges are approximated and healed.  No 

redness, swelling, signs of infection or drainage.  Speaks in full clear 

sentences.  Alert and oriented x4.  No tenderness to the area.


[]


 (MARIA LUZ FORTUNE)





Dragon Disclaimer:


Dragon Disclaimer:


This electronic medical record was generated, in whole or in part, using a voice

 recognition dictation system.


 (MARIA LUZ FORTUNE)





Departure


Departure


Impression:  


   Primary Impression:  


   Visit for suture removal


Disposition:  01 HOME / SELF CARE / HOMELESS


Condition:  STABLE


Referrals:  


UNKNOWN PCP NAME (PCP)


Patient Instructions:  Suture Removal





Additional Instructions:  


You can put Neosporin over the area to help the healing.





Attending Signature


Attending Signature


I have reviewed the PA/NP's note and plan of care. I was available for 

consultation as needed during the patient's visit in the emergency department. I

 agree with the clinical impression, plan, and disposition.


 (SUE HAYDEN DO)











MARIA LUZ FORTUNE            Oct 15, 2021 18:12


SUE HAYDEN DO             Oct 16, 2021 06:52

## 2022-01-19 ENCOUNTER — HOSPITAL ENCOUNTER (EMERGENCY)
Dept: HOSPITAL 61 - ER | Age: 72
Discharge: LEFT BEFORE DISCHARGE | End: 2022-01-19
Payer: MEDICARE

## 2022-01-19 VITALS — BODY MASS INDEX: 28.05 KG/M2 | HEIGHT: 73 IN | WEIGHT: 211.64 LBS

## 2022-01-19 VITALS — SYSTOLIC BLOOD PRESSURE: 142 MMHG | DIASTOLIC BLOOD PRESSURE: 70 MMHG

## 2022-01-19 DIAGNOSIS — Z53.21: ICD-10-CM

## 2022-01-19 DIAGNOSIS — G47.00: Primary | ICD-10-CM

## 2022-01-21 ENCOUNTER — HOSPITAL ENCOUNTER (EMERGENCY)
Dept: HOSPITAL 61 - ER | Age: 72
Discharge: HOME | End: 2022-01-21
Payer: MEDICARE

## 2022-01-21 VITALS — BODY MASS INDEX: 19.87 KG/M2 | HEIGHT: 73 IN | WEIGHT: 149.91 LBS

## 2022-01-21 VITALS — DIASTOLIC BLOOD PRESSURE: 82 MMHG | SYSTOLIC BLOOD PRESSURE: 161 MMHG

## 2022-01-21 DIAGNOSIS — I10: Primary | ICD-10-CM

## 2022-01-21 DIAGNOSIS — E78.00: ICD-10-CM

## 2022-01-21 DIAGNOSIS — F17.200: ICD-10-CM

## 2022-01-21 DIAGNOSIS — R42: ICD-10-CM

## 2022-01-21 DIAGNOSIS — Z95.5: ICD-10-CM

## 2022-01-21 DIAGNOSIS — F10.20: ICD-10-CM

## 2022-01-21 DIAGNOSIS — G89.29: ICD-10-CM

## 2022-01-21 DIAGNOSIS — F32.9: ICD-10-CM

## 2022-01-21 DIAGNOSIS — F41.9: ICD-10-CM
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
## 2022-01-21 DIAGNOSIS — Z88.8: ICD-10-CM

## 2022-01-21 DIAGNOSIS — Z88.6: ICD-10-CM

## 2022-01-21 LAB
ALBUMIN SERPL-MCNC: 4.1 G/DL (ref 3.4–5)
ALBUMIN/GLOB SERPL: 1.1 {RATIO} (ref 1–1.7)
ALP SERPL-CCNC: 90 U/L (ref 46–116)
ALT SERPL-CCNC: 40 U/L (ref 16–63)
AMPHETAMINE/METHAMPHETAMINE: (no result)
ANION GAP SERPL CALC-SCNC: 13 MMOL/L (ref 6–14)
APTT BLD: 26 SEC (ref 24–38)
APTT PPP: YELLOW S
AST SERPL-CCNC: 27 U/L (ref 15–37)
BACTERIA #/AREA URNS HPF: 0 /HPF
BARBITURATES UR-MCNC: (no result) UG/ML
BASOPHILS # BLD AUTO: 0 X10^3/UL (ref 0–0.2)
BASOPHILS NFR BLD: 0 % (ref 0–3)
BENZODIAZ UR-MCNC: (no result) UG/L
BILIRUB SERPL-MCNC: 0.4 MG/DL (ref 0.2–1)
BILIRUB UR QL STRIP: NEGATIVE
BUN SERPL-MCNC: 26 MG/DL (ref 8–26)
BUN/CREAT SERPL: 17 (ref 6–20)
CALCIUM SERPL-MCNC: 9.1 MG/DL (ref 8.5–10.1)
CANNABINOIDS UR-MCNC: (no result) UG/L
CHLORIDE SERPL-SCNC: 106 MMOL/L (ref 98–107)
CO2 SERPL-SCNC: 23 MMOL/L (ref 21–32)
COCAINE UR-MCNC: (no result) NG/ML
CREAT SERPL-MCNC: 1.5 MG/DL (ref 0.7–1.3)
EOSINOPHIL NFR BLD: 0.1 X10^3/UL (ref 0–0.7)
EOSINOPHIL NFR BLD: 2 % (ref 0–3)
ERYTHROCYTE [DISTWIDTH] IN BLOOD BY AUTOMATED COUNT: 15.5 % (ref 11.5–14.5)
FIBRINOGEN PPP-MCNC: CLEAR MG/DL
GFR SERPLBLD BASED ON 1.73 SQ M-ARVRAT: 56 ML/MIN
GLUCOSE SERPL-MCNC: 121 MG/DL (ref 70–99)
HCT VFR BLD CALC: 36.9 % (ref 39–53)
HGB BLD-MCNC: 12.2 G/DL (ref 13–17.5)
LIPASE: 421 U/L (ref 73–393)
LYMPHOCYTES # BLD: 1.2 X10^3/UL (ref 1–4.8)
LYMPHOCYTES NFR BLD AUTO: 12 % (ref 24–48)
MCH RBC QN AUTO: 27 PG (ref 25–35)
MCHC RBC AUTO-ENTMCNC: 33 G/DL (ref 31–37)
MCV RBC AUTO: 80 FL (ref 79–100)
METHADONE SERPL-MCNC: (no result) NG/ML
MONO #: 0.8 X10^3/UL (ref 0–1.1)
MONOCYTES NFR BLD: 8 % (ref 0–9)
NEUT #: 7.6 X10^3/UL (ref 1.8–7.7)
NEUTROPHILS NFR BLD AUTO: 78 % (ref 31–73)
NITRITE UR QL STRIP: NEGATIVE
OPIATES UR-MCNC: (no result) NG/ML
PCP SERPL-MCNC: (no result) MG/DL
PH UR STRIP: 5.5 [PH]
PLATELET # BLD AUTO: 269 X10^3/UL (ref 140–400)
POTASSIUM SERPL-SCNC: 4 MMOL/L (ref 3.5–5.1)
PROT SERPL-MCNC: 8 G/DL (ref 6.4–8.2)
PROT UR STRIP-MCNC: NEGATIVE MG/DL
PROTHROMBIN TIME: 12.6 SEC (ref 11.7–14)
RBC # BLD AUTO: 4.6 X10^6/UL (ref 4.3–5.7)
RBC #/AREA URNS HPF: 0 /HPF (ref 0–2)
SODIUM SERPL-SCNC: 142 MMOL/L (ref 136–145)
UROBILINOGEN UR-MCNC: 0.2 MG/DL
WBC # BLD AUTO: 9.7 X10^3/UL (ref 4–11)
WBC #/AREA URNS HPF: (no result) /HPF (ref 0–4)

## 2022-01-21 PROCEDURE — 71045 X-RAY EXAM CHEST 1 VIEW: CPT

## 2022-01-21 PROCEDURE — 93005 ELECTROCARDIOGRAM TRACING: CPT

## 2022-01-21 PROCEDURE — 96365 THER/PROPH/DIAG IV INF INIT: CPT

## 2022-01-21 PROCEDURE — 85610 PROTHROMBIN TIME: CPT

## 2022-01-21 PROCEDURE — 99284 EMERGENCY DEPT VISIT MOD MDM: CPT

## 2022-01-21 PROCEDURE — 36415 COLL VENOUS BLD VENIPUNCTURE: CPT

## 2022-01-21 PROCEDURE — 85730 THROMBOPLASTIN TIME PARTIAL: CPT

## 2022-01-21 PROCEDURE — 80307 DRUG TEST PRSMV CHEM ANLYZR: CPT

## 2022-01-21 PROCEDURE — 80053 COMPREHEN METABOLIC PANEL: CPT

## 2022-01-21 PROCEDURE — 84484 ASSAY OF TROPONIN QUANT: CPT

## 2022-01-21 PROCEDURE — 81001 URINALYSIS AUTO W/SCOPE: CPT

## 2022-01-21 PROCEDURE — 85025 COMPLETE CBC W/AUTO DIFF WBC: CPT

## 2022-01-21 PROCEDURE — 83690 ASSAY OF LIPASE: CPT

## 2022-01-21 NOTE — PHYS DOC
Past Medical History


Past Medical History:  Alcoholism, Anxiety, Cancer, Depression, High Rona

sterol, Hypertension, Other


Additional Past Medical Histor:  PTSD, MOOD DISORDER, CHRONIC PAIN, prostate ca

ncer


Past Surgical History:  No Surgical History, Other


Additional Past Surgical Histo:  cardiac cath with no stent placement, TURP


Smoking Status:  Current Every Day Smoker


Alcohol Use:  Heavy


Additional Information:  


Attempting cessation 01/2022


Drug Use:  None





General Adult


EDM:


Chief Complaint:  DIZZY/LIGHT HEADED





HPI:


HPI:





Patient is a 71 year old male with history of high blood pressure and alcoholism

who presents with 3-day history of lightheadedness and feeling "foggy." Patient 

states that earlier today, around 1930, he was outside using the grill. He began

to feel lightheaded. He took his blood pressure and it was elevated at 

190s/100s. Patient presented to the emergency department 2 days ago for similar 

symptoms, but left without being seen due to the long wait time and full waiting

room. Patient reports that typically, he drinks half pint of whiskey and two 

Smirnoff ice. However, in effort to be healthier and help support his wife, who 

recently lost a parent, he has stopped drinking. He states his last drink was 

Monday evening (4 days ago). Patient denies headache, generalized weakness, any 

recent falls, shaking/tremors/seizure-like activity, visual or auditory 

hallucinations, nausea or vomiting. He states he has actually been eating better

than while he drank daily. Due to the events and his wife's family, he reports 

he has felt increased stress, but he denies agitation.





Review of Systems:


Review of Systems:


Constitutional:  Denies fever, chills or generalized weakness


Eyes:  Denies change in visual acuity, visual field deficits or discharge


HENT:  Denies ear pain, nasal congestion or sore throat 


Respiratory:  Denies cough or shortness of breath 


Cardiovascular:  See HPI


GI:  See HPI


: Denies dysuria or hematuria


Musculoskeletal:  Denies back pain or joint pain 


Integument:  Denies rash or other skin lesion


Neurologic:  See HPI





Heart Score:


C/O Chest Pain:  No





Allergies:


Allergies:





Allergies








Coded Allergies Type Severity Reaction Last Updated Verified


 


  aspirin Allergy Severe lips/face swelling 6/16/21 Yes


 


  trazodone Adverse Reaction Intermediate "hot flashes" 6/16/21 Yes











Physical Exam:


PE:





Constitutional: Thin, no acute distress, non-toxic appearance. 


HENT: Normocephalic, atraumatic, bilateral external ears normal, oropharynx 

tacky, no oral exudates, nose normal. 


Eyes: PERRLA, EOMI, conjunctiva normal, no discharge.  


Neck: Normal range of motion, no stridor.  


Cardiovascular: Heart rate regular rhythm. No murmurs, rubs or gallops. 


Lungs & Thorax: Bilateral breath sounds clear to auscultation. 


Skin: Warm, dry, no erythema, no rash.  


Neurologic: Alert and oriented x4, steady and symmetrical gait, gross motor 

function 5/5 in extremities x4, cranial nerves III-XII grossly intact, no focal 

deficits noted, no involuntary body movements appreciated.





Current Patient Data:


Labs:





Laboratory Tests








Test


 1/21/22


21:25 1/21/22


22:05


 


White Blood Count


 9.7 x10^3/uL


(4.0-11.0) 





 


Red Blood Count


 4.60 x10^6/uL


(4.30-5.70) 





 


Hemoglobin


 12.2 g/dL


(13.0-17.5) 





 


Hematocrit


 36.9 %


(39.0-53.0) 





 


Mean Corpuscular Volume 80 fL ()  


 


Mean Corpuscular Hemoglobin 27 pg (25-35)  


 


Mean Corpuscular Hemoglobin


Concent 33 g/dL


(31-37) 





 


Red Cell Distribution Width


 15.5 %


(11.5-14.5) 





 


Platelet Count


 269 x10^3/uL


(140-400) 





 


Neutrophils (%) (Auto) 78 % (31-73)  


 


Lymphocytes (%) (Auto) 12 % (24-48)  


 


Monocytes (%) (Auto) 8 % (0-9)  


 


Eosinophils (%) (Auto) 2 % (0-3)  


 


Basophils (%) (Auto) 0 % (0-3)  


 


Neutrophils # (Auto)


 7.6 x10^3/uL


(1.8-7.7) 





 


Lymphocytes # (Auto)


 1.2 x10^3/uL


(1.0-4.8) 





 


Monocytes # (Auto)


 0.8 x10^3/uL


(0.0-1.1) 





 


Eosinophils # (Auto)


 0.1 x10^3/uL


(0.0-0.7) 





 


Basophils # (Auto)


 0.0 x10^3/uL


(0.0-0.2) 





 


Prothrombin Time


 12.6 SEC


(11.7-14.0) 





 


Prothromb Time International


Ratio 0.9 (0.8-1.1) 


 





 


Activated Partial


Thromboplast Time 26 SEC (24-38) 


 





 


Sodium Level


 142 mmol/L


(136-145) 





 


Potassium Level


 4.0 mmol/L


(3.5-5.1) 





 


Chloride Level


 106 mmol/L


() 





 


Carbon Dioxide Level


 23 mmol/L


(21-32) 





 


Anion Gap 13 (6-14)  


 


Blood Urea Nitrogen


 26 mg/dL


(8-26) 





 


Creatinine


 1.5 mg/dL


(0.7-1.3) 





 


Estimated GFR


(Cockcroft-Gault) 56.0 


 





 


BUN/Creatinine Ratio 17 (6-20)  


 


Glucose Level


 121 mg/dL


(70-99) 





 


Calcium Level


 9.1 mg/dL


(8.5-10.1) 





 


Total Bilirubin


 0.4 mg/dL


(0.2-1.0) 





 


Aspartate Amino Transf


(AST/SGOT) 27 U/L (15-37) 


 





 


Alanine Aminotransferase


(ALT/SGPT) 40 U/L (16-63) 


 





 


Alkaline Phosphatase


 90 U/L


() 





 


Troponin I High Sensitivity 6 ng/L (4-75)  


 


Total Protein


 8.0 g/dL


(6.4-8.2) 





 


Albumin


 4.1 g/dL


(3.4-5.0) 





 


Albumin/Globulin Ratio 1.1 (1.0-1.7)  


 


Lipase


 421 U/L


() 





 


Ethyl Alcohol Level


 < 10 mg/dL


(0-10) 





 


Urine Collection Type  Unknown 


 


Urine Color  Yellow 


 


Urine Clarity  Clear 


 


Urine pH  5.5 (<5.0-8.0) 


 


Urine Specific Gravity


 


 1.010


(1.000-1.030)


 


Urine Protein


 


 Negative mg/dL


(NEG-TRACE)


 


Urine Glucose (UA)


 


 Negative mg/dL


(NEG)


 


Urine Ketones (Stick)


 


 Negative mg/dL


(NEG)


 


Urine Blood  Negative (NEG) 


 


Urine Nitrite  Negative (NEG) 


 


Urine Bilirubin  Negative (NEG) 


 


Urine Urobilinogen Dipstick


 


 0.2 mg/dL (0.2


mg/dL)


 


Urine Leukocyte Esterase  Negative (NEG) 


 


Urine RBC  0 /HPF (0-2) 


 


Urine WBC


 


 Rare /HPF


(0-4)


 


Urine Squamous Epithelial


Cells 


 Occ /LPF 





 


Urine Bacteria  0 /HPF (0-FEW) 


 


Urine Mucus  Slight /LPF 


 


Urine Opiates Screen  Neg (NEG) 


 


Urine Methadone Screen  Neg (NEG) 


 


Urine Barbiturates  Neg (NEG) 


 


Urine Phencyclidine Screen  Neg (NEG) 


 


Urine


Amphetamine/Methamphetamine 


 Neg (NEG) 





 


Urine Benzodiazepines Screen  Neg (NEG) 


 


Urine Cocaine Screen  Neg (NEG) 


 


Urine Cannabinoids Screen  Neg (NEG) 


 


Urine Ethyl Alcohol  Neg (NEG) 








Vital Signs:





                                   Vital Signs








  Date Time  Temp Pulse Resp B/P (MAP) Pulse Ox O2 Delivery O2 Flow Rate FiO2


 


1/21/22 23:40  61 18 161/82 (108) 100 Room Air  


 


1/21/22 21:05 98.0 103 20 186/90 (122) 96 Room Air  





 98.0       











EKG:


EKG:


EKG Interpreted by Dr. Burk at 2215: Regular rate and rhythm 63 bpm bpm with 

no ectopic beats.  ms/QTc 419 ms. Inferior lead anatomical ST segment 

changes with T wave inversion. No STEMI.  Largely unchanged from EKG dated 8

/3/2017 with exception of heart rate being 80 bpm on comparison.





Radiology/Procedures:


Radiology/Procedures:


PROCEDURE: CHEST AP ONLY





EXAMINATION: Chest radiograph.





VIEWS: 1





COMPARISON: Lightheadedness





INDICATION:71 years, Male, 5/8/2017.





FINDINGS: 


Normal cardiomediastinal silhouette. Similar innumerable bilateral calcified 

granulomas. No focal consolidation. No pleural effusion or pneumothorax. No 

acute osseous process.





IMPRESSION:


No acute cardiopulmonary process.





Electronically signed by: Ankit Norman MD (1/21/2022 10:09 PM) [facility]





Course & Med Decision Making:


Course & Med Decision Making


Pertinent Labs and Imaging studies reviewed. (See chart for details)





Patient is a 71-year-old male with history of alcoholism, HTN, HLD and a current

smoker who presents with lightheadedness for the past 3 days.  Patient states he

stopped drinking alcohol 4 days ago.  Since that time he has no concerning 

symptoms for alcohol withdrawal.  Work-up today will include labs (including 

troponin and blood alcohol level), EKG, chest x-ray, urinalysis, UDS.  Patient 

provided with a liter of IV fluids, thiamine IV and vitamin containing folate.





On reevaluation, patient is feeling better and his blood pressure has continued 

to improve.  EKG did show some nonspecific ST segment changes, however it is 

unchanged from EKG done in 2017.  Patient is instructed to follow-up with 

cardiology on an outpatient basis as soon as he is able.  He is also advised to 

keep his appointments with his psychiatric supportive care as well as his 

primary care doctor next month.  Patient given p.o. vitamin supplementation 

instruction, return precautions.  Patient understands and is agreeable to 

discharge plan.





Dragon Disclaimer:


Dragon Disclaimer:


This electronic medical record was generated, in whole or in part, using a voice

recognition dictation system.





Departure


Departure


Impression:  


   Primary Impression:  


   HTN (hypertension)


   Qualified Codes:  I10 - Essential (primary) hypertension


   Additional Impressions:  


   History of alcoholism


   Intermittent lightheadedness


Disposition:  01 HOME / SELF CARE / HOMELESS


Condition:  IMPROVED


Referrals:  


NO PCP (PCP)


Patient Instructions:  Alcohol Withdrawal, Easy-to-Read, DASH Diet, Iron-Rich 

Diet





Additional Instructions:  


EMERGENCY DEPARTMENT GENERAL DISCHARGE INSTRUCTIONS





Thank you for coming to [facility] Emergency Department (ED) 

today and trusting us with you care.  We trust that you had a positive 

experience in our Emergency Department.  If you wish to speak to the department 

management, you may call the director at [phone].





YOUR FOLLOW UP INSTRUCTIONS ARE AS FOLLOWS:


1.  Follow up with your primary care doctor and a heart specialist 

(cardiologist). If you do not have a primary doctor, please ask for a resource 

list of physicians or clinics that may be able to assist you with follow up 

care.


2.  The emergency provider has interpreted your imaging studies, if any were 

ordered.  The radiology imaging specialist also reviewed them.  If there is a 

change in the findings, you will be notified in 48 hours when at all possible.


3.  If a lab test or culture has been done, your results will be reviewed and 

you will be notified if you need a change in treatment.


4.  Follow instructions verbalized to you and refer to the printouts if needed. 

Begin taking vitamin B-1 (thiamine) supplement as well as a daily vitamin 

containing folate (example: prenatal vitamins). 





ADDITIONAL INSTRUCTIONS AND INFORMATION:


1.  Your care today has been supervised by a physician who is specially trained 

in emergency care.  Many problems require more than one evaluation for a com

plete diagnosis and treatment.  We recommend that you schedule your follow up 

appointment as recommended to ensure complete treatment of you illness or 

injury.  If you are unable to obtain follow up care and continue to have a 

problem, or if your condition worsens, we recommend that you return to the ED.


2.  We are not able to safely determine your condition over the phone nor are we

able to give sound medical advice over the phone.  For these safety reasons, if 

you call for medical advice we will ask you to come to the ED for further 

evaluation.


3.  If you have any questions regarding these discharge instructions please call

the ED at [phone].





SAFETY INFORMATION:


In the interest of safety, wellness, and injury prevention; we encourage you to 

wear your seat belt, if you smoke; quite smoking, and we encourage family to use

a protective helmet for bicycling and other sporting events that present an 

increased risk for head injury.





IF YOUR SYMPTOMS WORSEN OR NEW SYMPTOMS DEVELOP, OR YOU HAVE CONCERNS ABOUT YOUR

CONDITION; OR IF YOUR CONDITION WORSENS WHILE YOU ARE WAITING FOR YOUR FOLLOW UP

APPOINTMENT; EITHER CONTACT YOUR PRIMARY CARE DOCTOR, THE PHYSICIAN WHOSE NAME 

AND NUMBER YOU WERE GIVEN, OR RETURN TO THE ED IMMEDIATELY.











MIKEL LIZ              Jan 21, 2022 21:38

## 2022-01-21 NOTE — EKG
[facility]

              [address] [place] [address]

Test Date:    2022               Test Time:    22:14:37

Pat Name:     JOEL VAUGHAN              Department:   

Patient ID:   [id number]           Room:          

Gender:       M                        Technician:   

:          [id number]               Requested By: MIKEL LIZ

Order Number: [id number]           Reading MD:   Bud Bravo MD

                                 Measurements

Intervals                              Axis          

Rate:         63                       P:            34

NJ:           170                      QRS:          -20

QRSD:         78                       T:            0

QT:           406                                    

QTc:          419                                    

                           Interpretive Statements

SINUS RHYTHM

LAD

Electronically Signed On 2022 9:23:58 CST by Bud Bravo MD

## 2022-01-21 NOTE — RAD
EXAMINATION: Chest radiograph.



VIEWS: 1



COMPARISON: Lightheadedness



INDICATION:71 years, Male, 5/8/2017.



FINDINGS: 

Normal cardiomediastinal silhouette. Similar innumerable bilateral calcified granulomas. No focal con
solidation. No pleural effusion or pneumothorax. No acute osseous process.



IMPRESSION:

No acute cardiopulmonary process.



Electronically signed by: Ankit Norman MD (1/21/2022 10:09 PM) [facility]